# Patient Record
Sex: MALE | Race: ASIAN | NOT HISPANIC OR LATINO
[De-identification: names, ages, dates, MRNs, and addresses within clinical notes are randomized per-mention and may not be internally consistent; named-entity substitution may affect disease eponyms.]

---

## 2019-05-14 PROBLEM — Z00.00 ENCOUNTER FOR PREVENTIVE HEALTH EXAMINATION: Status: ACTIVE | Noted: 2019-05-14

## 2019-05-17 ENCOUNTER — APPOINTMENT (OUTPATIENT)
Dept: CARDIOLOGY | Facility: CLINIC | Age: 74
End: 2019-05-17
Payer: COMMERCIAL

## 2019-05-17 ENCOUNTER — NON-APPOINTMENT (OUTPATIENT)
Age: 74
End: 2019-05-17

## 2019-05-17 VITALS
DIASTOLIC BLOOD PRESSURE: 73 MMHG | HEIGHT: 63 IN | BODY MASS INDEX: 23.04 KG/M2 | HEART RATE: 65 BPM | WEIGHT: 130 LBS | OXYGEN SATURATION: 95 % | SYSTOLIC BLOOD PRESSURE: 152 MMHG

## 2019-05-17 DIAGNOSIS — H40.9 UNSPECIFIED GLAUCOMA: ICD-10-CM

## 2019-05-17 PROCEDURE — 99205 OFFICE O/P NEW HI 60 MIN: CPT

## 2019-05-17 PROCEDURE — 93000 ELECTROCARDIOGRAM COMPLETE: CPT

## 2019-05-17 RX ORDER — MULTIVITAMIN
TABLET ORAL
Refills: 0 | Status: ACTIVE | COMMUNITY

## 2019-05-17 RX ORDER — CHROMIUM 200 MCG
TABLET ORAL
Refills: 0 | Status: ACTIVE | COMMUNITY

## 2019-05-17 RX ORDER — ASPIRIN 81 MG/1
81 TABLET ORAL
Refills: 0 | Status: ACTIVE | COMMUNITY

## 2019-06-06 NOTE — PHYSICAL EXAM
[General Appearance - Well Developed] : well developed [Normal Appearance] : normal appearance [No Deformities] : no deformities [General Appearance - Well Nourished] : well nourished [Well Groomed] : well groomed [Normal Conjunctiva] : the conjunctiva exhibited no abnormalities [General Appearance - In No Acute Distress] : no acute distress [Normal Oral Mucosa] : normal oral mucosa [Eyelids - No Xanthelasma] : the eyelids demonstrated no xanthelasmas [No Oral Pallor] : no oral pallor [Normal Jugular Venous A Waves Present] : normal jugular venous A waves present [No Oral Cyanosis] : no oral cyanosis [Normal Jugular Venous V Waves Present] : normal jugular venous V waves present [Heart Rate And Rhythm] : heart rate and rhythm were normal [No Jugular Venous Myers A Waves] : no jugular venous myers A waves [Heart Sounds] : normal S1 and S2 [Murmurs] : no murmurs present [Respiration, Rhythm And Depth] : normal respiratory rhythm and effort [Exaggerated Use Of Accessory Muscles For Inspiration] : no accessory muscle use [Auscultation Breath Sounds / Voice Sounds] : lungs were clear to auscultation bilaterally [Abdomen Tenderness] : non-tender [Abdomen Soft] : soft [Gait - Sufficient For Exercise Testing] : the gait was sufficient for exercise testing [Abdomen Mass (___ Cm)] : no abdominal mass palpated [Abnormal Walk] : normal gait [Nail Clubbing] : no clubbing of the fingernails [Cyanosis, Localized] : no localized cyanosis [Petechial Hemorrhages (___cm)] : no petechial hemorrhages [Skin Color & Pigmentation] : normal skin color and pigmentation [Skin Lesions] : no skin lesions [] : no rash [No Venous Stasis] : no venous stasis [No Xanthoma] : no  xanthoma was observed [No Skin Ulcers] : no skin ulcer [Oriented To Time, Place, And Person] : oriented to person, place, and time [Affect] : the affect was normal [Mood] : the mood was normal [No Anxiety] : not feeling anxious

## 2019-06-06 NOTE — HISTORY OF PRESENT ILLNESS
[FreeTextEntry1] : 73 year old male with h/o CAD, HTN, HLD who presents withseveral weeks of chest pressure with exertion .  Denies SOB or H/a, rest sx or radiation of pain

## 2019-06-06 NOTE — DISCUSSION/SUMMARY
[FreeTextEntry1] : CAD/CP- plan nuclear stress test for new onset anginal sx\par \par HTN- stable\par \par HLD- controlled\par \par Followup in 2 weeks

## 2019-06-11 ENCOUNTER — OUTPATIENT (OUTPATIENT)
Dept: OUTPATIENT SERVICES | Facility: HOSPITAL | Age: 74
LOS: 1 days | End: 2019-06-11
Payer: MEDICARE

## 2019-06-11 ENCOUNTER — APPOINTMENT (OUTPATIENT)
Dept: CV DIAGNOSTICS | Facility: HOSPITAL | Age: 74
End: 2019-06-11

## 2019-06-11 DIAGNOSIS — I25.10 ATHEROSCLEROTIC HEART DISEASE OF NATIVE CORONARY ARTERY WITHOUT ANGINA PECTORIS: ICD-10-CM

## 2019-06-11 PROCEDURE — 78452 HT MUSCLE IMAGE SPECT MULT: CPT | Mod: 26

## 2019-06-11 PROCEDURE — 93018 CV STRESS TEST I&R ONLY: CPT

## 2019-06-11 PROCEDURE — 93017 CV STRESS TEST TRACING ONLY: CPT

## 2019-06-11 PROCEDURE — 78452 HT MUSCLE IMAGE SPECT MULT: CPT

## 2019-06-11 PROCEDURE — A9500: CPT

## 2019-06-11 PROCEDURE — 93016 CV STRESS TEST SUPVJ ONLY: CPT

## 2019-07-16 ENCOUNTER — OUTPATIENT (OUTPATIENT)
Dept: OUTPATIENT SERVICES | Facility: HOSPITAL | Age: 74
LOS: 1 days | End: 2019-07-16
Payer: MEDICARE

## 2019-07-16 VITALS
RESPIRATION RATE: 16 BRPM | SYSTOLIC BLOOD PRESSURE: 148 MMHG | HEIGHT: 63 IN | DIASTOLIC BLOOD PRESSURE: 77 MMHG | OXYGEN SATURATION: 98 % | WEIGHT: 130.07 LBS | TEMPERATURE: 98 F | HEART RATE: 56 BPM

## 2019-07-16 DIAGNOSIS — R07.89 OTHER CHEST PAIN: ICD-10-CM

## 2019-07-16 LAB
ALBUMIN SERPL ELPH-MCNC: 4.4 G/DL — SIGNIFICANT CHANGE UP (ref 3.3–5)
ALP SERPL-CCNC: 70 U/L — SIGNIFICANT CHANGE UP (ref 40–120)
ALT FLD-CCNC: 15 U/L — SIGNIFICANT CHANGE UP (ref 10–45)
ANION GAP SERPL CALC-SCNC: 9 MMOL/L — SIGNIFICANT CHANGE UP (ref 5–17)
AST SERPL-CCNC: 24 U/L — SIGNIFICANT CHANGE UP (ref 10–40)
BILIRUB SERPL-MCNC: 0.8 MG/DL — SIGNIFICANT CHANGE UP (ref 0.2–1.2)
BUN SERPL-MCNC: 17 MG/DL — SIGNIFICANT CHANGE UP (ref 7–23)
CALCIUM SERPL-MCNC: 9.8 MG/DL — SIGNIFICANT CHANGE UP (ref 8.4–10.5)
CHLORIDE SERPL-SCNC: 101 MMOL/L — SIGNIFICANT CHANGE UP (ref 96–108)
CO2 SERPL-SCNC: 27 MMOL/L — SIGNIFICANT CHANGE UP (ref 22–31)
CREAT SERPL-MCNC: 1.41 MG/DL — HIGH (ref 0.5–1.3)
GLUCOSE SERPL-MCNC: 105 MG/DL — HIGH (ref 70–99)
HCT VFR BLD CALC: 44.4 % — SIGNIFICANT CHANGE UP (ref 39–50)
HGB BLD-MCNC: 15 G/DL — SIGNIFICANT CHANGE UP (ref 13–17)
MCHC RBC-ENTMCNC: 32.4 PG — SIGNIFICANT CHANGE UP (ref 27–34)
MCHC RBC-ENTMCNC: 33.9 GM/DL — SIGNIFICANT CHANGE UP (ref 32–36)
MCV RBC AUTO: 95.5 FL — SIGNIFICANT CHANGE UP (ref 80–100)
PLATELET # BLD AUTO: 217 K/UL — SIGNIFICANT CHANGE UP (ref 150–400)
POTASSIUM SERPL-MCNC: 4.2 MMOL/L — SIGNIFICANT CHANGE UP (ref 3.5–5.3)
POTASSIUM SERPL-SCNC: 4.2 MMOL/L — SIGNIFICANT CHANGE UP (ref 3.5–5.3)
PROT SERPL-MCNC: 7.8 G/DL — SIGNIFICANT CHANGE UP (ref 6–8.3)
RBC # BLD: 4.65 M/UL — SIGNIFICANT CHANGE UP (ref 4.2–5.8)
RBC # FLD: 10.7 % — SIGNIFICANT CHANGE UP (ref 10.3–14.5)
SODIUM SERPL-SCNC: 137 MMOL/L — SIGNIFICANT CHANGE UP (ref 135–145)
WBC # BLD: 5.8 K/UL — SIGNIFICANT CHANGE UP (ref 3.8–10.5)
WBC # FLD AUTO: 5.8 K/UL — SIGNIFICANT CHANGE UP (ref 3.8–10.5)

## 2019-07-16 PROCEDURE — 93455 CORONARY ART/GRFT ANGIO S&I: CPT

## 2019-07-16 PROCEDURE — 93005 ELECTROCARDIOGRAM TRACING: CPT

## 2019-07-16 PROCEDURE — 99152 MOD SED SAME PHYS/QHP 5/>YRS: CPT | Mod: GC

## 2019-07-16 PROCEDURE — C1894: CPT

## 2019-07-16 PROCEDURE — C1769: CPT

## 2019-07-16 PROCEDURE — 99152 MOD SED SAME PHYS/QHP 5/>YRS: CPT

## 2019-07-16 PROCEDURE — C1887: CPT

## 2019-07-16 PROCEDURE — 93567 NJX CAR CTH SPRVLV AORTGRPHY: CPT | Mod: GC

## 2019-07-16 PROCEDURE — 80053 COMPREHEN METABOLIC PANEL: CPT

## 2019-07-16 PROCEDURE — 93455 CORONARY ART/GRFT ANGIO S&I: CPT | Mod: 26,GC

## 2019-07-16 PROCEDURE — 93567 NJX CAR CTH SPRVLV AORTGRPHY: CPT

## 2019-07-16 PROCEDURE — 85027 COMPLETE CBC AUTOMATED: CPT

## 2019-07-16 PROCEDURE — 93010 ELECTROCARDIOGRAM REPORT: CPT | Mod: 59

## 2019-07-16 NOTE — H&P CARDIOLOGY - HISTORY OF PRESENT ILLNESS
73 years old  ([place]) male with PMHx CDA s/p CABG 2009, HTN, HLD who was evaluated by Dr Coffey for several weeks of chest pressure and dyspnea and underwent nuclear stress test that was abnormal (result below) and was referred for [facility] for further ischemic evaluation. Denied dizziness, diaphoresis, palpitations, nausea, vomiting, peripheral edema, recent weight gain, or syncope.     < from: Nuclear Stress Test-Exercise (06.11.19 @ 10:06) >  IMPRESSIONS:Abnormal Study  * Exercise capacity: 10 METS, Excellent for age and  gender.  * Chest Pain: No chest pain with exercise.  * Symptom: Fatigue.  * HR Response: Appropriate.  * BP Response: Appropriate.  * Heart Rhythm: Sinus Bradycardia - 58 BPM.  * Conduction defects: incomplete right bundle brach block.  * Baseline ECG: Nonspecific ST-T wave abnormality.  * ECG Changes: ST Depression: 0.5 mm upsloping in leads  V4-V6 started at 08:31 min of exercise at HR of 142 and  persisted 03:00 min into recovery.  * Arrhythmia: None.  * The left ventricle was small in size. There is a small,  mild to moderate defect in the mid to distal anterolateral  wall that is mostly reversible suggestive of ischemia with  partial scarring.  * There are medium-sized, mild to moderate defects in the  basal to mid inferior and basal inferoseptal walls that  are mostly fixed suggestive of infarct with mild  ernesto-infarct ischemia.  * Post-stress gated wall motion analysis was performed  (LVEF > 70%;LVEDV = 43 ml.) revealing mild hypokinesis of  the basal to mid inferior wall and reduced systolic  thickening of the mid to distal anterolateral wall.  There  is paradoxical septal motion consistent with a  post-operative state.  *** No previous Nuclear/Stress exam.  ------------------------------------------------------------------------  Confirmed on  6/11/2019 - 12:08:29 by Malcolm Alston M.D.  ------------------------------------------------------------------------    < end of copied text > 73 years old  ([place]) male with PMHx CDA s/p CABG 2009, HTN, HLD who was evaluated by Dr Coffey for several weeks of chest pressure and dyspnea and underwent nuclear stress test that was abnormal (result below) and was referred for [facility] for further ischemic evaluation today. Pt denies dizziness, diaphoresis, palpitations, nausea, vomiting, peripheral edema, recent weight gain, or syncope.     < from: Nuclear Stress Test-Exercise (06.11.19 @ 10:06) >  IMPRESSIONS:Abnormal Study  * Exercise capacity: 10 METS, Excellent for age and  gender.  * Chest Pain: No chest pain with exercise.  * Symptom: Fatigue.  * HR Response: Appropriate.  * BP Response: Appropriate.  * Heart Rhythm: Sinus Bradycardia - 58 BPM.  * Conduction defects: incomplete right bundle brach block.  * Baseline ECG: Nonspecific ST-T wave abnormality.  * ECG Changes: ST Depression: 0.5 mm upsloping in leads  V4-V6 started at 08:31 min of exercise at HR of 142 and  persisted 03:00 min into recovery.  * Arrhythmia: None.  * The left ventricle was small in size. There is a small,  mild to moderate defect in the mid to distal anterolateral  wall that is mostly reversible suggestive of ischemia with  partial scarring.  * There are medium-sized, mild to moderate defects in the  basal to mid inferior and basal inferoseptal walls that  are mostly fixed suggestive of infarct with mild  ernesto-infarct ischemia.  * Post-stress gated wall motion analysis was performed  (LVEF > 70%;LVEDV = 43 ml.) revealing mild hypokinesis of  the basal to mid inferior wall and reduced systolic  thickening of the mid to distal anterolateral wall.  There  is paradoxical septal motion consistent with a  post-operative state.  *** No previous Nuclear/Stress exam.  ------------------------------------------------------------------------  Confirmed on  6/11/2019 - 12:08:29 by Malcolm Alston M.D.  ------------------------------------------------------------------------    < end of copied text >

## 2019-07-16 NOTE — H&P CARDIOLOGY - PSH
Bilateral Cataracts  Patient had macular repair 2000.  S/P CABG X 4  6/2009  S/P Coronary Angiogram  with stent - 2001

## 2019-07-18 ENCOUNTER — TRANSCRIPTION ENCOUNTER (OUTPATIENT)
Age: 74
End: 2019-07-18

## 2019-07-22 ENCOUNTER — APPOINTMENT (OUTPATIENT)
Dept: CARDIOLOGY | Facility: CLINIC | Age: 74
End: 2019-07-22
Payer: MEDICARE

## 2019-07-22 ENCOUNTER — NON-APPOINTMENT (OUTPATIENT)
Age: 74
End: 2019-07-22

## 2019-07-22 VITALS
WEIGHT: 130 LBS | HEIGHT: 63 IN | SYSTOLIC BLOOD PRESSURE: 155 MMHG | OXYGEN SATURATION: 96 % | DIASTOLIC BLOOD PRESSURE: 74 MMHG | HEART RATE: 57 BPM | BODY MASS INDEX: 23.04 KG/M2

## 2019-07-22 PROBLEM — I25.10 ATHEROSCLEROTIC HEART DISEASE OF NATIVE CORONARY ARTERY WITHOUT ANGINA PECTORIS: Chronic | Status: ACTIVE | Noted: 2019-07-16

## 2019-07-22 PROBLEM — I10 ESSENTIAL (PRIMARY) HYPERTENSION: Chronic | Status: ACTIVE | Noted: 2019-07-16

## 2019-07-22 PROCEDURE — 93000 ELECTROCARDIOGRAM COMPLETE: CPT

## 2019-07-22 PROCEDURE — 99215 OFFICE O/P EST HI 40 MIN: CPT

## 2019-08-04 NOTE — DISCUSSION/SUMMARY
[FreeTextEntry1] : CAD/CP-stress positive.  Plan cath\par \par HTN- stable\par \par HLD- controlled\par \par Followup in 3 weeks

## 2019-08-04 NOTE — HISTORY OF PRESENT ILLNESS
[FreeTextEntry1] : 73 year old male with h/o CAD, HTN, HLD who presents withseveral weeks of chest pressure with exertion .  Denies SOB or H/a, rest sx or radiation of pain.  Stress test with ischemia

## 2019-08-04 NOTE — PHYSICAL EXAM
[General Appearance - Well Developed] : well developed [Normal Appearance] : normal appearance [General Appearance - Well Nourished] : well nourished [Well Groomed] : well groomed [General Appearance - In No Acute Distress] : no acute distress [No Deformities] : no deformities [Eyelids - No Xanthelasma] : the eyelids demonstrated no xanthelasmas [Normal Conjunctiva] : the conjunctiva exhibited no abnormalities [No Oral Pallor] : no oral pallor [Normal Oral Mucosa] : normal oral mucosa [Normal Jugular Venous A Waves Present] : normal jugular venous A waves present [No Oral Cyanosis] : no oral cyanosis [Normal Jugular Venous V Waves Present] : normal jugular venous V waves present [No Jugular Venous Myers A Waves] : no jugular venous myers A waves [Respiration, Rhythm And Depth] : normal respiratory rhythm and effort [Auscultation Breath Sounds / Voice Sounds] : lungs were clear to auscultation bilaterally [Heart Rate And Rhythm] : heart rate and rhythm were normal [Exaggerated Use Of Accessory Muscles For Inspiration] : no accessory muscle use [Heart Sounds] : normal S1 and S2 [Murmurs] : no murmurs present [Abdomen Soft] : soft [Abdomen Tenderness] : non-tender [Abnormal Walk] : normal gait [Abdomen Mass (___ Cm)] : no abdominal mass palpated [Gait - Sufficient For Exercise Testing] : the gait was sufficient for exercise testing [Nail Clubbing] : no clubbing of the fingernails [Petechial Hemorrhages (___cm)] : no petechial hemorrhages [Cyanosis, Localized] : no localized cyanosis [] : no rash [Skin Color & Pigmentation] : normal skin color and pigmentation [Skin Lesions] : no skin lesions [No Venous Stasis] : no venous stasis [No Skin Ulcers] : no skin ulcer [No Xanthoma] : no  xanthoma was observed [Oriented To Time, Place, And Person] : oriented to person, place, and time [Affect] : the affect was normal [Mood] : the mood was normal [No Anxiety] : not feeling anxious

## 2020-02-14 ENCOUNTER — NON-APPOINTMENT (OUTPATIENT)
Age: 75
End: 2020-02-14

## 2020-02-14 ENCOUNTER — APPOINTMENT (OUTPATIENT)
Dept: CARDIOLOGY | Facility: CLINIC | Age: 75
End: 2020-02-14
Payer: MEDICARE

## 2020-02-14 VITALS
BODY MASS INDEX: 22.15 KG/M2 | HEIGHT: 63 IN | WEIGHT: 125 LBS | HEART RATE: 58 BPM | SYSTOLIC BLOOD PRESSURE: 144 MMHG | DIASTOLIC BLOOD PRESSURE: 77 MMHG | OXYGEN SATURATION: 98 %

## 2020-02-14 PROCEDURE — 99214 OFFICE O/P EST MOD 30 MIN: CPT

## 2020-02-14 PROCEDURE — 93000 ELECTROCARDIOGRAM COMPLETE: CPT

## 2020-03-15 NOTE — PHYSICAL EXAM
[General Appearance - Well Developed] : well developed [Normal Appearance] : normal appearance [Well Groomed] : well groomed [General Appearance - Well Nourished] : well nourished [No Deformities] : no deformities [General Appearance - In No Acute Distress] : no acute distress [Normal Conjunctiva] : the conjunctiva exhibited no abnormalities [Eyelids - No Xanthelasma] : the eyelids demonstrated no xanthelasmas [Normal Oral Mucosa] : normal oral mucosa [No Oral Pallor] : no oral pallor [No Oral Cyanosis] : no oral cyanosis [Normal Jugular Venous A Waves Present] : normal jugular venous A waves present [Normal Jugular Venous V Waves Present] : normal jugular venous V waves present [No Jugular Venous Myers A Waves] : no jugular venous myers A waves [Respiration, Rhythm And Depth] : normal respiratory rhythm and effort [Exaggerated Use Of Accessory Muscles For Inspiration] : no accessory muscle use [Auscultation Breath Sounds / Voice Sounds] : lungs were clear to auscultation bilaterally [Heart Rate And Rhythm] : heart rate and rhythm were normal [Heart Sounds] : normal S1 and S2 [Murmurs] : no murmurs present [Abdomen Soft] : soft [Abdomen Tenderness] : non-tender [Abdomen Mass (___ Cm)] : no abdominal mass palpated [Abnormal Walk] : normal gait [Gait - Sufficient For Exercise Testing] : the gait was sufficient for exercise testing [Nail Clubbing] : no clubbing of the fingernails [Cyanosis, Localized] : no localized cyanosis [Petechial Hemorrhages (___cm)] : no petechial hemorrhages [Skin Color & Pigmentation] : normal skin color and pigmentation [] : no rash [No Venous Stasis] : no venous stasis [Skin Lesions] : no skin lesions [No Skin Ulcers] : no skin ulcer [No Xanthoma] : no  xanthoma was observed [Oriented To Time, Place, And Person] : oriented to person, place, and time [Affect] : the affect was normal [Mood] : the mood was normal [No Anxiety] : not feeling anxious

## 2020-03-15 NOTE — HISTORY OF PRESENT ILLNESS
[FreeTextEntry1] : 74 year old male with h/o CAD, HTN, HLD who feels well without any c/o CP, SOB or H/A

## 2020-03-15 NOTE — DISCUSSION/SUMMARY
[FreeTextEntry1] : CAD/CP-no sx\par \par HTN- stable\par \par HLD- controlled\par \par Followup in 3 mths

## 2020-08-14 ENCOUNTER — APPOINTMENT (OUTPATIENT)
Dept: CARDIOLOGY | Facility: CLINIC | Age: 75
End: 2020-08-14

## 2020-08-21 ENCOUNTER — APPOINTMENT (OUTPATIENT)
Dept: CARDIOLOGY | Facility: CLINIC | Age: 75
End: 2020-08-21
Payer: MEDICARE

## 2020-08-21 ENCOUNTER — NON-APPOINTMENT (OUTPATIENT)
Age: 75
End: 2020-08-21

## 2020-08-21 VITALS
OXYGEN SATURATION: 100 % | BODY MASS INDEX: 22.14 KG/M2 | HEART RATE: 50 BPM | WEIGHT: 125 LBS | DIASTOLIC BLOOD PRESSURE: 76 MMHG | SYSTOLIC BLOOD PRESSURE: 145 MMHG

## 2020-08-21 PROCEDURE — 99214 OFFICE O/P EST MOD 30 MIN: CPT

## 2020-08-21 PROCEDURE — 93000 ELECTROCARDIOGRAM COMPLETE: CPT

## 2020-09-10 NOTE — DISCUSSION/SUMMARY
[FreeTextEntry1] : CAD/CP-no sx\par \par HTN- stable\par \par HLD- controlled\par \par Followup in 6 mths

## 2020-09-10 NOTE — HISTORY OF PRESENT ILLNESS
[FreeTextEntry1] : 75 year old male with h/o CAD, HTN, HLD who feels well without any c/o CP, SOB or H/A

## 2021-03-05 ENCOUNTER — APPOINTMENT (OUTPATIENT)
Dept: CARDIOLOGY | Facility: CLINIC | Age: 76
End: 2021-03-05
Payer: MEDICARE

## 2021-03-05 ENCOUNTER — NON-APPOINTMENT (OUTPATIENT)
Age: 76
End: 2021-03-05

## 2021-03-05 VITALS
HEART RATE: 60 BPM | DIASTOLIC BLOOD PRESSURE: 69 MMHG | SYSTOLIC BLOOD PRESSURE: 148 MMHG | BODY MASS INDEX: 22.15 KG/M2 | OXYGEN SATURATION: 99 % | WEIGHT: 125 LBS | HEIGHT: 63 IN

## 2021-03-05 PROCEDURE — 99072 ADDL SUPL MATRL&STAF TM PHE: CPT

## 2021-03-05 PROCEDURE — 93000 ELECTROCARDIOGRAM COMPLETE: CPT

## 2021-03-05 PROCEDURE — 99214 OFFICE O/P EST MOD 30 MIN: CPT

## 2021-03-15 NOTE — HISTORY OF PRESENT ILLNESS
[FreeTextEntry1] : 75 year old male with h/o CAD s/p CABG, HTN, HLD who feels well without any c/o CP, SOB or H/A

## 2021-06-20 ENCOUNTER — INPATIENT (INPATIENT)
Facility: HOSPITAL | Age: 76
LOS: 3 days | Discharge: HOME | DRG: 867 | End: 2021-06-24
Attending: INTERNAL MEDICINE | Admitting: INTERNAL MEDICINE
Payer: COMMERCIAL

## 2021-06-20 VITALS
RESPIRATION RATE: 19 BRPM | SYSTOLIC BLOOD PRESSURE: 122 MMHG | TEMPERATURE: 100 F | OXYGEN SATURATION: 98 % | HEIGHT: 63 IN | WEIGHT: 130.07 LBS | HEART RATE: 69 BPM | DIASTOLIC BLOOD PRESSURE: 57 MMHG

## 2021-06-20 DIAGNOSIS — R50.9 FEVER, UNSPECIFIED: ICD-10-CM

## 2021-06-20 LAB
ALBUMIN SERPL ELPH-MCNC: 3.4 G/DL — SIGNIFICANT CHANGE UP (ref 3.3–5)
ALP SERPL-CCNC: 68 U/L — SIGNIFICANT CHANGE UP (ref 40–120)
ALT FLD-CCNC: 20 U/L — SIGNIFICANT CHANGE UP (ref 10–45)
ANION GAP SERPL CALC-SCNC: 12 MMOL/L — SIGNIFICANT CHANGE UP (ref 5–17)
APPEARANCE UR: CLEAR — SIGNIFICANT CHANGE UP
AST SERPL-CCNC: 47 U/L — HIGH (ref 10–40)
BACTERIA # UR AUTO: NEGATIVE — SIGNIFICANT CHANGE UP
BASOPHILS # BLD AUTO: 0 K/UL — SIGNIFICANT CHANGE UP (ref 0–0.2)
BASOPHILS NFR BLD AUTO: 0 % — SIGNIFICANT CHANGE UP (ref 0–2)
BILIRUB SERPL-MCNC: 0.9 MG/DL — SIGNIFICANT CHANGE UP (ref 0.2–1.2)
BILIRUB UR-MCNC: NEGATIVE — SIGNIFICANT CHANGE UP
BUN SERPL-MCNC: 24 MG/DL — HIGH (ref 7–23)
CALCIUM SERPL-MCNC: 9 MG/DL — SIGNIFICANT CHANGE UP (ref 8.4–10.5)
CHLORIDE SERPL-SCNC: 101 MMOL/L — SIGNIFICANT CHANGE UP (ref 96–108)
CO2 SERPL-SCNC: 22 MMOL/L — SIGNIFICANT CHANGE UP (ref 22–31)
COLOR SPEC: YELLOW — SIGNIFICANT CHANGE UP
CREAT SERPL-MCNC: 1.59 MG/DL — HIGH (ref 0.5–1.3)
DIFF PNL FLD: NEGATIVE — SIGNIFICANT CHANGE UP
EOSINOPHIL # BLD AUTO: 0 K/UL — SIGNIFICANT CHANGE UP (ref 0–0.5)
EOSINOPHIL NFR BLD AUTO: 0 % — SIGNIFICANT CHANGE UP (ref 0–6)
EPI CELLS # UR: 0 /HPF — SIGNIFICANT CHANGE UP
GAS PNL BLDV: SIGNIFICANT CHANGE UP
GLUCOSE SERPL-MCNC: 113 MG/DL — HIGH (ref 70–99)
GLUCOSE UR QL: NEGATIVE — SIGNIFICANT CHANGE UP
HCT VFR BLD CALC: 35 % — LOW (ref 39–50)
HGB BLD-MCNC: 11.6 G/DL — LOW (ref 13–17)
KETONES UR-MCNC: NEGATIVE — SIGNIFICANT CHANGE UP
LEUKOCYTE ESTERASE UR-ACNC: NEGATIVE — SIGNIFICANT CHANGE UP
LYMPHOCYTES # BLD AUTO: 1.19 K/UL — SIGNIFICANT CHANGE UP (ref 1–3.3)
LYMPHOCYTES # BLD AUTO: 19.1 % — SIGNIFICANT CHANGE UP (ref 13–44)
MAGNESIUM SERPL-MCNC: 2.4 MG/DL — SIGNIFICANT CHANGE UP (ref 1.6–2.6)
MCHC RBC-ENTMCNC: 31.3 PG — SIGNIFICANT CHANGE UP (ref 27–34)
MCHC RBC-ENTMCNC: 33.1 GM/DL — SIGNIFICANT CHANGE UP (ref 32–36)
MCV RBC AUTO: 94.3 FL — SIGNIFICANT CHANGE UP (ref 80–100)
MONOCYTES # BLD AUTO: 0.92 K/UL — HIGH (ref 0–0.9)
MONOCYTES NFR BLD AUTO: 14.8 % — HIGH (ref 2–14)
NEUTROPHILS # BLD AUTO: 3.68 K/UL — SIGNIFICANT CHANGE UP (ref 1.8–7.4)
NEUTROPHILS NFR BLD AUTO: 55.6 % — SIGNIFICANT CHANGE UP (ref 43–77)
NITRITE UR-MCNC: NEGATIVE — SIGNIFICANT CHANGE UP
PH UR: 6 — SIGNIFICANT CHANGE UP (ref 5–8)
PHOSPHATE SERPL-MCNC: 3.7 MG/DL — SIGNIFICANT CHANGE UP (ref 2.5–4.5)
PLATELET # BLD AUTO: SIGNIFICANT CHANGE UP K/UL (ref 150–400)
POTASSIUM SERPL-MCNC: 4.6 MMOL/L — SIGNIFICANT CHANGE UP (ref 3.5–5.3)
POTASSIUM SERPL-SCNC: 4.6 MMOL/L — SIGNIFICANT CHANGE UP (ref 3.5–5.3)
PROT SERPL-MCNC: 7.6 G/DL — SIGNIFICANT CHANGE UP (ref 6–8.3)
PROT UR-MCNC: ABNORMAL
RAPID RVP RESULT: SIGNIFICANT CHANGE UP
RBC # BLD: 3.71 M/UL — LOW (ref 4.2–5.8)
RBC # FLD: 11.8 % — SIGNIFICANT CHANGE UP (ref 10.3–14.5)
RBC CASTS # UR COMP ASSIST: 2 /HPF — SIGNIFICANT CHANGE UP (ref 0–4)
SARS-COV-2 RNA SPEC QL NAA+PROBE: SIGNIFICANT CHANGE UP
SODIUM SERPL-SCNC: 135 MMOL/L — SIGNIFICANT CHANGE UP (ref 135–145)
SP GR SPEC: 1.01 — SIGNIFICANT CHANGE UP (ref 1.01–1.02)
UROBILINOGEN FLD QL: NEGATIVE — SIGNIFICANT CHANGE UP
WBC # BLD: 6.22 K/UL — SIGNIFICANT CHANGE UP (ref 3.8–10.5)
WBC # FLD AUTO: 6.22 K/UL — SIGNIFICANT CHANGE UP (ref 3.8–10.5)
WBC UR QL: 0 /HPF — SIGNIFICANT CHANGE UP (ref 0–5)

## 2021-06-20 PROCEDURE — 93010 ELECTROCARDIOGRAM REPORT: CPT

## 2021-06-20 PROCEDURE — 99223 1ST HOSP IP/OBS HIGH 75: CPT

## 2021-06-20 PROCEDURE — 99285 EMERGENCY DEPT VISIT HI MDM: CPT

## 2021-06-20 PROCEDURE — 71045 X-RAY EXAM CHEST 1 VIEW: CPT | Mod: 26

## 2021-06-20 RX ORDER — SODIUM CHLORIDE 9 MG/ML
500 INJECTION INTRAMUSCULAR; INTRAVENOUS; SUBCUTANEOUS ONCE
Refills: 0 | Status: COMPLETED | OUTPATIENT
Start: 2021-06-20 | End: 2021-06-20

## 2021-06-20 RX ORDER — OMEGA-3 ACID ETHYL ESTERS 1 G
5 CAPSULE ORAL
Qty: 0 | Refills: 0 | DISCHARGE

## 2021-06-20 RX ORDER — SODIUM CHLORIDE 9 MG/ML
1000 INJECTION INTRAMUSCULAR; INTRAVENOUS; SUBCUTANEOUS ONCE
Refills: 0 | Status: COMPLETED | OUTPATIENT
Start: 2021-06-20 | End: 2021-06-20

## 2021-06-20 RX ORDER — ACETAMINOPHEN 500 MG
975 TABLET ORAL ONCE
Refills: 0 | Status: COMPLETED | OUTPATIENT
Start: 2021-06-20 | End: 2021-06-20

## 2021-06-20 RX ORDER — MULTIVIT-MIN/FERROUS GLUCONATE 9 MG/15 ML
1 LIQUID (ML) ORAL
Qty: 0 | Refills: 0 | DISCHARGE

## 2021-06-20 RX ORDER — PIPERACILLIN AND TAZOBACTAM 4; .5 G/20ML; G/20ML
3.38 INJECTION, POWDER, LYOPHILIZED, FOR SOLUTION INTRAVENOUS ONCE
Refills: 0 | Status: COMPLETED | OUTPATIENT
Start: 2021-06-20 | End: 2021-06-20

## 2021-06-20 RX ORDER — ATENOLOL 25 MG/1
1 TABLET ORAL
Qty: 0 | Refills: 0 | DISCHARGE

## 2021-06-20 RX ADMIN — SODIUM CHLORIDE 500 MILLILITER(S): 9 INJECTION INTRAMUSCULAR; INTRAVENOUS; SUBCUTANEOUS at 20:48

## 2021-06-20 RX ADMIN — SODIUM CHLORIDE 1000 MILLILITER(S): 9 INJECTION INTRAMUSCULAR; INTRAVENOUS; SUBCUTANEOUS at 19:25

## 2021-06-20 RX ADMIN — PIPERACILLIN AND TAZOBACTAM 200 GRAM(S): 4; .5 INJECTION, POWDER, LYOPHILIZED, FOR SOLUTION INTRAVENOUS at 21:45

## 2021-06-20 RX ADMIN — Medication 975 MILLIGRAM(S): at 18:54

## 2021-06-20 NOTE — ED ADULT NURSE REASSESSMENT NOTE - NS ED NURSE REASSESS COMMENT FT1
Received report @ 1900, pt lying in bed comfortably & denies any pain/discomfort. Pt given 1L NS for BP as per MD order. Pt given call bell and educated on how to use.

## 2021-06-20 NOTE — ED PROVIDER NOTE - PHYSICAL EXAMINATION
GENERAL: NAD, well-developed  HEAD:  Atraumatic, Normocephalic  EYES: EOMI, PERRLA, conjunctiva and sclera clear  NECK: Supple, No JVD  CHEST/LUNG: Clear to auscultation bilaterally; wheeze RUL  HEART: Regular rate and rhythm; No murmurs, rubs, or gallops  ABDOMEN: Soft, Nontender, Nondistended; Bowel sounds present  EXTREMITIES:  2+ Peripheral Pulses, No clubbing, cyanosis, or edema  PSYCH: AAOx3  NEUROLOGY: non-focal  SKIN: No rashes or lesions

## 2021-06-20 NOTE — ED ADULT NURSE NOTE - CHIEF COMPLAINT QUOTE
fever and fatigue x 4 days. Pt denies cough, abd pain, chest pain, burning upon urination. Pt fully vaccinated for COVID.

## 2021-06-20 NOTE — H&P ADULT - NSHPLABSRESULTS_GEN_ALL_CORE
Labs personally reviewed:                        11.6   6.22  )-----------( Clumped    ( 2021 16:50 )             35.0     06-20    135  |  101  |  24<H>  ----------------------------<  113<H>  4.6   |  22  |  1.59<H>    Ca    9.0      2021 16:50  Phos  3.7     06-20  Mg     2.4     06-20    TPro  7.6  /  Alb  3.4  /  TBili  0.9  /  DBili  x   /  AST  47<H>  /  ALT  20  /  AlkPhos  68  06-20        LIVER FUNCTIONS - ( 2021 16:50 )  Alb: 3.4 g/dL / Pro: 7.6 g/dL / ALK PHOS: 68 U/L / ALT: 20 U/L / AST: 47 U/L / GGT: x             Urinalysis Basic - ( 2021 18:31 )    Color: Yellow / Appearance: Clear / S.011 / pH: x  Gluc: x / Ketone: Negative  / Bili: Negative / Urobili: Negative   Blood: x / Protein: Trace / Nitrite: Negative   Leuk Esterase: Negative / RBC: 2 /hpf / WBC 0 /HPF   Sq Epi: x / Non Sq Epi: 0 /hpf / Bacteria: Negative      Imaging personally reviewed-no clear source of fever    EKG ordered    CT ordered

## 2021-06-20 NOTE — H&P ADULT - NSICDXPASTSURGICALHX_GEN_ALL_CORE_FT
PAST SURGICAL HISTORY:  Bilateral Cataracts Patient had macular repair 2000.    S/P CABG X 4 6/2009    S/P Coronary Angiogram with stent - 2001

## 2021-06-20 NOTE — ED ADULT NURSE NOTE - OBJECTIVE STATEMENT
74 yo male from home A&OX4 ambulatory on arrival c/o fevers/fatigue over the last several days. Pt st has been experiencing low grade fevers responsive to Tylenol but recurrent with increasing levels of weakness/fatigue. Pt denies cough, SOB, chx pn, abd pn, n/v/d/dizziness. Pt st he was concerned also that his diastolic BP was in the 50's and has a hx of cardiac bypass.  Pt VS stable, labs drawn and sent, results pending.

## 2021-06-20 NOTE — H&P ADULT - NSHPPHYSICALEXAM_GEN_ALL_CORE
Vital Signs Last 24 Hrs  T(C): 36.8 (20 Jun 2021 22:09), Max: 38.5 (20 Jun 2021 18:45)  T(F): 98.3 (20 Jun 2021 22:09), Max: 101.3 (20 Jun 2021 18:45)  HR: 64 (20 Jun 2021 22:09) (64 - 76)  BP: 100/58 (20 Jun 2021 22:09) (97/52 - 122/57)  BP(mean): 68 (20 Jun 2021 21:53) (66 - 74)  RR: 18 (20 Jun 2021 22:09) (17 - 19)  SpO2: 97% (20 Jun 2021 22:09) (96% - 98%)    GENERAL: No acute distress, well-developed  HEAD:  Atraumatic, Normocephalic  EYES: EOMI, PERRLA, conjunctiva and sclera clear  ENT: Oral mucosa moist  NECK: Neck supple  CHEST/LUNG: Clear to auscultation bilaterally; No wheeze, no rales, no rhonchi.    HEART: Regular rate and rhythm; No murmurs, rubs, or gallops  ABDOMEN: Soft, Nontender, Nondistended; Bowel sounds present  EXTREMITIES:  No clubbing, cyanosis, or edema  VASCULAR: Posterior tibialis pulses intact bilaterally  PSYCH: Normal behavior, normal affect  NEUROLOGY: AAOx3  SKIN: grossly warm and dry

## 2021-06-20 NOTE — ED PROVIDER NOTE - ATTENDING CONTRIBUTION TO CARE
75yr M hx of CAD s/p stent on asa and CABG remotely w fever of uncertain origin. ROS neg. exam notable for well appearing, no distress, clear lungs, ear exam unremarkable, oropharynx clear, no cervical LAP, supple neck, S1-2, soft non distended abd, no peripheral edema and no skin lesions.  suspect viral infection, low concern for bacterial infection given immune competent and asx. will do labs, including trop, cxr, ua.   labs unremarkable, viral panel neg. pt had soft BPs, responded to bolus  repeat bps low. not safe for outpatient follow up, will do IV fluids, and empiric antibiotics and admit. pro estefany added.

## 2021-06-20 NOTE — H&P ADULT - ASSESSMENT
74 yo M w/ PMHx of CAD s/p stent (on ASA) and CABG, glaucoma, HTN, who is presenting for fever and weakness x 4 days associated with hypotension.

## 2021-06-20 NOTE — ED PROVIDER NOTE - CLINICAL SUMMARY MEDICAL DECISION MAKING FREE TEXT BOX
74 yo M w/ PMHx of CAD s/p stent (on ASA) and CABG who is presenting for fever x 4 days.      Sepsis w/u

## 2021-06-20 NOTE — ED PROVIDER NOTE - OBJECTIVE STATEMENT
74 yo M w/ PMHx of CAD s/p stent (on ASA) and CABG who is presenting for fever x 4 days.  Pt reports that he's been having fevers up to 101, 102 degrees w/ resolution w/ Tylenol.  He denies any symptoms: no SOB, no cough, no rhinorrhea, no abdominal pain, n/v/d/c, no dysuria or increased frequency.  Pt just reports feeling very weak and his blood pressure has 76 yo M w/ PMHx of CAD s/p stent (on ASA) and CABG who is presenting for fever x 4 days.  Pt reports that he's been having fevers up to 101, 102 degrees w/ resolution w/ Tylenol.  He denies any symptoms: no SOB, no cough, no rhinorrhea, no abdominal pain, n/v/d/c, no dysuria or increased frequency.  Pt just reports feeling very weak and his blood pressure has been lower than usual (diastolics in the 40s).  Pt has never had a history of sepsis or bacteremia.  No recent travel or sick contacts. Vaccination for COVID completed in Feb.

## 2021-06-20 NOTE — H&P ADULT - PROBLEM SELECTOR PLAN 1
Unclear cause  f/u cultures  given no clear source so far will obtain CT Chest/Abd/Pelvis to look for possible sources

## 2021-06-20 NOTE — ED PROVIDER NOTE - NS ED ROS FT
Spoke to pt and relayed provider's msg. Pt is now scheduled to see Eileen on 10/18/17 @ 10:45 am for f/u OV with labs (recheck CMP).    Sent adjusted dosage of rx for calcium over to [facility] pharmacy.  Pt is going to  calcium tablets at her local pharmacy to hold her until she get her rx in the mail.   CONSTITUTIONAL: + weakness, fevers or chills  EYES/ENT: No visual changes;  No vertigo or throat pain   NECK: No pain or stiffness  RESPIRATORY: No cough, wheezing, hemoptysis; No shortness of breath  CARDIOVASCULAR: No chest pain or palpitations  GASTROINTESTINAL: No abdominal or epigastric pain. No nausea, vomiting, or hematemesis; No diarrhea or constipation. No melena or hematochezia.  GENITOURINARY: No dysuria, frequency or hematuria  NEUROLOGICAL: No numbness + generalized weakness  SKIN: No itching, rashes

## 2021-06-20 NOTE — H&P ADULT - NSICDXPASTMEDICALHX_GEN_ALL_CORE_FT
PAST MEDICAL HISTORY:  CAD (coronary artery disease)     Colon Polyps     H/O: glaucoma     Hypercholesteremia     Hypertension

## 2021-06-20 NOTE — ED PROVIDER NOTE - DISPOSITION TYPE
Dermatology Rooming Note    Luan Mitchell's goals for this visit include:   Chief Complaint   Patient presents with     Derm Problem     Bereket is here today for a concerning area on his right hand.  States he noticed it in March.          aJylyn Houser LPN  
ADMIT

## 2021-06-20 NOTE — ED ADULT TRIAGE NOTE - CHIEF COMPLAINT QUOTE
fever and fatigue x 4 days. Pt denies cough, abd pain, burning upon urination. fever and fatigue x 4 days. Pt denies cough, abd pain, burning upon urination. Pt fully vaccinated for COVID. fever and fatigue x 4 days. Pt denies cough, abd pain, chest pain, burning upon urination. Pt fully vaccinated for COVID.

## 2021-06-20 NOTE — H&P ADULT - HISTORY OF PRESENT ILLNESS
74 yo M w/ PMHx of CAD s/p stent (on ASA) and CABG, glaucoma, HTN, who is presenting for fever and weakness x 4 days associated with hypotension. Patient reports that he has had fevers and chills x 4 days. Has felt generally weak, lethargic. Laid in bed a lot. No focal signs localizing infection however, no sore throat, no cough, no diarrhea no nausea or vomiting, no dysuria, no chest pain, no rashes (has some hyperpigmentation that has been there for some time in groin folds but no rash). Lives with wife who has not had any fevers/chills. No recent dental work. Last colonoscopy 5-6 years ago with some polyps that were removed. No recent surgeries/travel/sick contacts. Had COVID19 vaccine second dose in February.

## 2021-06-21 DIAGNOSIS — H40.9 UNSPECIFIED GLAUCOMA: ICD-10-CM

## 2021-06-21 DIAGNOSIS — R50.9 FEVER, UNSPECIFIED: ICD-10-CM

## 2021-06-21 DIAGNOSIS — I25.10 ATHEROSCLEROTIC HEART DISEASE OF NATIVE CORONARY ARTERY WITHOUT ANGINA PECTORIS: ICD-10-CM

## 2021-06-21 DIAGNOSIS — I10 ESSENTIAL (PRIMARY) HYPERTENSION: ICD-10-CM

## 2021-06-21 LAB
ANION GAP SERPL CALC-SCNC: 12 MMOL/L — SIGNIFICANT CHANGE UP (ref 5–17)
ANION GAP SERPL CALC-SCNC: 15 MMOL/L — SIGNIFICANT CHANGE UP (ref 5–17)
BASOPHILS # BLD AUTO: 0 K/UL — SIGNIFICANT CHANGE UP (ref 0–0.2)
BASOPHILS NFR BLD AUTO: 0 % — SIGNIFICANT CHANGE UP (ref 0–2)
BLD GP AB SCN SERPL QL: NEGATIVE — SIGNIFICANT CHANGE UP
BUN SERPL-MCNC: 21 MG/DL — SIGNIFICANT CHANGE UP (ref 7–23)
BUN SERPL-MCNC: 21 MG/DL — SIGNIFICANT CHANGE UP (ref 7–23)
CALCIUM SERPL-MCNC: 8.3 MG/DL — LOW (ref 8.4–10.5)
CALCIUM SERPL-MCNC: 9 MG/DL — SIGNIFICANT CHANGE UP (ref 8.4–10.5)
CHLORIDE SERPL-SCNC: 101 MMOL/L — SIGNIFICANT CHANGE UP (ref 96–108)
CHLORIDE SERPL-SCNC: 103 MMOL/L — SIGNIFICANT CHANGE UP (ref 96–108)
CO2 SERPL-SCNC: 19 MMOL/L — LOW (ref 22–31)
CO2 SERPL-SCNC: 19 MMOL/L — LOW (ref 22–31)
COVID-19 SPIKE DOMAIN AB INTERP: POSITIVE
COVID-19 SPIKE DOMAIN ANTIBODY RESULT: >250 U/ML — HIGH
CREAT SERPL-MCNC: 1.56 MG/DL — HIGH (ref 0.5–1.3)
CREAT SERPL-MCNC: 1.71 MG/DL — HIGH (ref 0.5–1.3)
EOSINOPHIL # BLD AUTO: 0.05 K/UL — SIGNIFICANT CHANGE UP (ref 0–0.5)
EOSINOPHIL NFR BLD AUTO: 0.9 % — SIGNIFICANT CHANGE UP (ref 0–6)
GLUCOSE SERPL-MCNC: 110 MG/DL — HIGH (ref 70–99)
GLUCOSE SERPL-MCNC: 143 MG/DL — HIGH (ref 70–99)
HCT VFR BLD CALC: 29.3 % — LOW (ref 39–50)
HCT VFR BLD CALC: 35.5 % — LOW (ref 39–50)
HCV AB S/CO SERPL IA: 0.37 S/CO — SIGNIFICANT CHANGE UP (ref 0–0.99)
HCV AB SERPL-IMP: SIGNIFICANT CHANGE UP
HGB BLD-MCNC: 11.7 G/DL — LOW (ref 13–17)
HGB BLD-MCNC: 9.9 G/DL — LOW (ref 13–17)
IRON SATN MFR SERPL: 10 % — LOW (ref 16–55)
IRON SATN MFR SERPL: 26 UG/DL — LOW (ref 45–165)
LACTATE SERPL-SCNC: 1.3 MMOL/L — SIGNIFICANT CHANGE UP (ref 0.7–2)
LYMPHOCYTES # BLD AUTO: 0.45 K/UL — LOW (ref 1–3.3)
LYMPHOCYTES # BLD AUTO: 8.8 % — LOW (ref 13–44)
MAGNESIUM SERPL-MCNC: 2.2 MG/DL — SIGNIFICANT CHANGE UP (ref 1.6–2.6)
MCHC RBC-ENTMCNC: 31 PG — SIGNIFICANT CHANGE UP (ref 27–34)
MCHC RBC-ENTMCNC: 31.7 PG — SIGNIFICANT CHANGE UP (ref 27–34)
MCHC RBC-ENTMCNC: 33 GM/DL — SIGNIFICANT CHANGE UP (ref 32–36)
MCHC RBC-ENTMCNC: 33.8 GM/DL — SIGNIFICANT CHANGE UP (ref 32–36)
MCV RBC AUTO: 93.9 FL — SIGNIFICANT CHANGE UP (ref 80–100)
MCV RBC AUTO: 94.2 FL — SIGNIFICANT CHANGE UP (ref 80–100)
MONOCYTES # BLD AUTO: 0.67 K/UL — SIGNIFICANT CHANGE UP (ref 0–0.9)
MONOCYTES NFR BLD AUTO: 13.1 % — SIGNIFICANT CHANGE UP (ref 2–14)
NEUTROPHILS # BLD AUTO: 3.93 K/UL — SIGNIFICANT CHANGE UP (ref 1.8–7.4)
NEUTROPHILS NFR BLD AUTO: 74.6 % — SIGNIFICANT CHANGE UP (ref 43–77)
NRBC # BLD: 0 /100 WBCS — SIGNIFICANT CHANGE UP (ref 0–0)
PHOSPHATE SERPL-MCNC: 2.5 MG/DL — SIGNIFICANT CHANGE UP (ref 2.5–4.5)
PLATELET # BLD AUTO: 67 K/UL — LOW (ref 150–400)
PLATELET # BLD AUTO: 77 K/UL — LOW (ref 150–400)
POTASSIUM SERPL-MCNC: 3.9 MMOL/L — SIGNIFICANT CHANGE UP (ref 3.5–5.3)
POTASSIUM SERPL-MCNC: 4.6 MMOL/L — SIGNIFICANT CHANGE UP (ref 3.5–5.3)
POTASSIUM SERPL-SCNC: 3.9 MMOL/L — SIGNIFICANT CHANGE UP (ref 3.5–5.3)
POTASSIUM SERPL-SCNC: 4.6 MMOL/L — SIGNIFICANT CHANGE UP (ref 3.5–5.3)
RBC # BLD: 3.12 M/UL — LOW (ref 4.2–5.8)
RBC # BLD: 3.77 M/UL — LOW (ref 4.2–5.8)
RBC # BLD: 3.77 M/UL — LOW (ref 4.2–5.8)
RBC # FLD: 11.7 % — SIGNIFICANT CHANGE UP (ref 10.3–14.5)
RBC # FLD: 11.8 % — SIGNIFICANT CHANGE UP (ref 10.3–14.5)
RETICS #: 113.5 K/UL — SIGNIFICANT CHANGE UP (ref 25–125)
RETICS/RBC NFR: 3 % — HIGH (ref 0.5–2.5)
RH IG SCN BLD-IMP: POSITIVE — SIGNIFICANT CHANGE UP
SARS-COV-2 IGG+IGM SERPL QL IA: >250 U/ML — HIGH
SARS-COV-2 IGG+IGM SERPL QL IA: POSITIVE
SODIUM SERPL-SCNC: 132 MMOL/L — LOW (ref 135–145)
SODIUM SERPL-SCNC: 137 MMOL/L — SIGNIFICANT CHANGE UP (ref 135–145)
TIBC SERPL-MCNC: 249 UG/DL — SIGNIFICANT CHANGE UP (ref 220–430)
UIBC SERPL-MCNC: 223 UG/DL — SIGNIFICANT CHANGE UP (ref 110–370)
WBC # BLD: 5.09 K/UL — SIGNIFICANT CHANGE UP (ref 3.8–10.5)
WBC # BLD: 6.37 K/UL — SIGNIFICANT CHANGE UP (ref 3.8–10.5)
WBC # FLD AUTO: 5.09 K/UL — SIGNIFICANT CHANGE UP (ref 3.8–10.5)
WBC # FLD AUTO: 6.37 K/UL — SIGNIFICANT CHANGE UP (ref 3.8–10.5)

## 2021-06-21 PROCEDURE — 99222 1ST HOSP IP/OBS MODERATE 55: CPT

## 2021-06-21 PROCEDURE — 71260 CT THORAX DX C+: CPT | Mod: 26

## 2021-06-21 PROCEDURE — 74177 CT ABD & PELVIS W/CONTRAST: CPT | Mod: 26

## 2021-06-21 RX ORDER — MULTIVIT-MIN/FERROUS GLUCONATE 9 MG/15 ML
1 LIQUID (ML) ORAL
Qty: 0 | Refills: 0 | DISCHARGE

## 2021-06-21 RX ORDER — ASPIRIN/CALCIUM CARB/MAGNESIUM 324 MG
1 TABLET ORAL
Qty: 0 | Refills: 0 | DISCHARGE

## 2021-06-21 RX ORDER — ACETAMINOPHEN 500 MG
1000 TABLET ORAL ONCE
Refills: 0 | Status: COMPLETED | OUTPATIENT
Start: 2021-06-21 | End: 2021-06-21

## 2021-06-21 RX ORDER — ASPIRIN/CALCIUM CARB/MAGNESIUM 324 MG
81 TABLET ORAL DAILY
Refills: 0 | Status: DISCONTINUED | OUTPATIENT
Start: 2021-06-21 | End: 2021-06-24

## 2021-06-21 RX ORDER — SODIUM CHLORIDE 9 MG/ML
1000 INJECTION INTRAMUSCULAR; INTRAVENOUS; SUBCUTANEOUS
Refills: 0 | Status: DISCONTINUED | OUTPATIENT
Start: 2021-06-21 | End: 2021-06-23

## 2021-06-21 RX ORDER — SIMVASTATIN 20 MG/1
40 TABLET, FILM COATED ORAL AT BEDTIME
Refills: 0 | Status: DISCONTINUED | OUTPATIENT
Start: 2021-06-21 | End: 2021-06-24

## 2021-06-21 RX ORDER — ACETAMINOPHEN 500 MG
1 TABLET ORAL
Qty: 0 | Refills: 0 | DISCHARGE

## 2021-06-21 RX ORDER — SIMVASTATIN 20 MG/1
1 TABLET, FILM COATED ORAL
Qty: 0 | Refills: 0 | DISCHARGE

## 2021-06-21 RX ORDER — CHOLECALCIFEROL (VITAMIN D3) 125 MCG
1 CAPSULE ORAL
Qty: 0 | Refills: 0 | DISCHARGE

## 2021-06-21 RX ORDER — PIPERACILLIN AND TAZOBACTAM 4; .5 G/20ML; G/20ML
3.38 INJECTION, POWDER, LYOPHILIZED, FOR SOLUTION INTRAVENOUS EVERY 8 HOURS
Refills: 0 | Status: DISCONTINUED | OUTPATIENT
Start: 2021-06-21 | End: 2021-06-22

## 2021-06-21 RX ORDER — ACETAMINOPHEN 500 MG
650 TABLET ORAL EVERY 6 HOURS
Refills: 0 | Status: DISCONTINUED | OUTPATIENT
Start: 2021-06-21 | End: 2021-06-24

## 2021-06-21 RX ORDER — LATANOPROST 0.05 MG/ML
1 SOLUTION/ DROPS OPHTHALMIC; TOPICAL AT BEDTIME
Refills: 0 | Status: DISCONTINUED | OUTPATIENT
Start: 2021-06-21 | End: 2021-06-24

## 2021-06-21 RX ADMIN — SIMVASTATIN 40 MILLIGRAM(S): 20 TABLET, FILM COATED ORAL at 22:07

## 2021-06-21 RX ADMIN — SODIUM CHLORIDE 100 MILLILITER(S): 9 INJECTION INTRAMUSCULAR; INTRAVENOUS; SUBCUTANEOUS at 10:39

## 2021-06-21 RX ADMIN — Medication 100 MILLIGRAM(S): at 17:29

## 2021-06-21 RX ADMIN — Medication 650 MILLIGRAM(S): at 15:27

## 2021-06-21 RX ADMIN — Medication 400 MILLIGRAM(S): at 22:07

## 2021-06-21 RX ADMIN — Medication 1000 MILLIGRAM(S): at 23:00

## 2021-06-21 RX ADMIN — LATANOPROST 1 DROP(S): 0.05 SOLUTION/ DROPS OPHTHALMIC; TOPICAL at 01:47

## 2021-06-21 RX ADMIN — Medication 650 MILLIGRAM(S): at 17:35

## 2021-06-21 RX ADMIN — Medication 650 MILLIGRAM(S): at 05:00

## 2021-06-21 RX ADMIN — LATANOPROST 1 DROP(S): 0.05 SOLUTION/ DROPS OPHTHALMIC; TOPICAL at 22:07

## 2021-06-21 RX ADMIN — SODIUM CHLORIDE 60 MILLILITER(S): 9 INJECTION INTRAMUSCULAR; INTRAVENOUS; SUBCUTANEOUS at 23:35

## 2021-06-21 RX ADMIN — Medication 650 MILLIGRAM(S): at 07:42

## 2021-06-21 RX ADMIN — Medication 81 MILLIGRAM(S): at 11:15

## 2021-06-21 RX ADMIN — PIPERACILLIN AND TAZOBACTAM 25 GRAM(S): 4; .5 INJECTION, POWDER, LYOPHILIZED, FOR SOLUTION INTRAVENOUS at 11:02

## 2021-06-21 RX ADMIN — PIPERACILLIN AND TAZOBACTAM 25 GRAM(S): 4; .5 INJECTION, POWDER, LYOPHILIZED, FOR SOLUTION INTRAVENOUS at 01:47

## 2021-06-21 RX ADMIN — PIPERACILLIN AND TAZOBACTAM 25 GRAM(S): 4; .5 INJECTION, POWDER, LYOPHILIZED, FOR SOLUTION INTRAVENOUS at 17:29

## 2021-06-21 NOTE — CONSULT NOTE ADULT - SUBJECTIVE AND OBJECTIVE BOX
Charles Doyle MD  Interventional Cardiology / Endovascular Specialist  [place] Office : [phone] [place] [address] [place] [address]  Tel:   [place] Office : [address] [facility] [place] [address]  Tel: [phone]  Cell : [phone]      HISTORY OF PRESENTING ILLNESS:  74 yo M w/ PMHx of CAD s/p stent (on ASA) and CABG, glaucoma, HTN, who is presenting for fever and weakness x 4 days associated with hypotension. Patient reports that he has had fevers and chills x 4 days. Has felt generally weak, lethargic. Laid in bed a lot. No focal signs localizing infection however, no sore throat, no cough, no diarrhea no nausea or vomiting, no dysuria, no chest pain, no rashes (has some hyperpigmentation that has been there for some time in groin folds but no rash). Lives with wife who has not had any fevers/chills. No recent dental work. Last colonoscopy 5-6 years ago with some polyps that were removed. No recent surgeries/travel/sick contacts. Had COVID19 vaccine second dose in February.        PAST MEDICAL & SURGICAL HISTORY:  Hypercholesteremia    Colon Polyps    Hypertension    CAD (coronary artery disease)    H/O: glaucoma    Bilateral Cataracts  Patient had macular repair 2000.    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 No nausea, vomiting, or hematemesis; No diarrhea or constipation. No melena or hematochezia.  GENITOURINARY: No dysuria, frequency, hematuria, or incontinence  NEUROLOGICAL: No headaches, memory loss, loss of strength, numbness, or tremors  ENDOCRINE: No heat or cold intolerance; No hair loss  MUSCULOSKELETAL: No joint pain or swelling; No muscle, back, or extremity pain  PSYCHIATRIC: No depression, anxiety, mood swings, or difficulty sleeping  HEME/LYMPH: No easy bruising, or bleeding gums  All others negative    MEDICATIONS:  aspirin enteric coated 81 milliGRAM(s) Oral daily    doxycycline hyclate Capsule 100 milliGRAM(s) Oral every 12 hours  piperacillin/tazobactam IVPB.. 3.375 Gram(s) IV Intermittent every 8 hours      acetaminophen   Tablet .. 650 milliGRAM(s) Oral every 6 hours PRN      simvastatin 40 milliGRAM(s) Oral at bedtime    latanoprost 0.005% Ophthalmic Solution 1 Drop(s) Both EYES at bedtime  sodium chloride 0.9%. 1000 milliLiter(s) IV Continuous <Continuous>      FAMILY HISTORY:  No pertinent family history in first degree relatives          Allergies    No Known Allergies    Intolerances    	      PHYSICAL EXAM:  T(C): 39.8 (06-21-21 @ 23:00), Max: 40 (06-21-21 @ 21:43)  HR: 84 (06-21-21 @ 21:30) (62 - 84)  BP: 122/62 (06-21-21 @ 21:30) (98/58 - 124/57)  RR: 18 (06-21-21 @ 21:43) (17 - 22)  SpO2: 98% (06-21-21 @ 21:43) (86% - 98%)  Wt(kg): --  I&O's Summary    20 Jun 2021 07:01  -  21 Jun 2021 07:00  --------------------------------------------------------  IN: 0 mL / OUT: 1060 mL / NET: -1060 mL    21 Jun 2021 07:01  -  21 Jun 2021 23:18  --------------------------------------------------------  IN: 240 mL / OUT: 0 mL / NET: 240 mL        GENERAL: NAD  EYES: EOMI, PERRLA, conjunctiva and sclera clear  ENMT: No tonsillar erythema, exudates, or enlargement  Cardiovascular: Normal S1 S2, No JVD, No murmurs, No edema  Respiratory: Lungs clear to auscultation	  Gastrointestinal:  Soft, Non-tender, + BS	  Extremities: No edema      LABS:	 	    CARDIAC MARKERS:                                  11.7   6.37  )-----------( 77       ( 21 Jun 2021 10:22 )             35.5     06-21    137  |  103  |  21  ----------------------------<  143<H>  4.6   |  19<L>  |  1.71<H>    Ca    9.0      21 Jun 2021 10:22  Phos  2.5     06-21  Mg     2.2     06-21    TPro  7.6  /  Alb  3.4  /  TBili  0.9  /  DBili  x   /  AST  47<H>  /  ALT  20  /  AlkPhos  68  06-20    proBNP:   Lipid Profile:   HgA1c:   TSH:     Consultant(s) Notes Reviewed:  [x ] YES  [ ] NO    Care Discussed with Consultants/Other Providers [ x] YES  [ ] NO    Imaging Personally Reviewed independently:  [x] YES  [ ] NO    All labs, radiologic studies, vitals, orders and medications list reviewed. Patient is seen and examined at bedside. Case discussed with medical team.    ASSESSMENT/PLAN:

## 2021-06-21 NOTE — CONSULT NOTE ADULT - ASSESSMENT
74 yo M w/ PMHx of CAD s/p stent (on ASA) and CABG, glaucoma, HTN, who is presenting for fever and weakness x 4 days associated with hypotension.     Overall fever, relative hypotension, thrombocytopenia, ? anemia vs baseline.   mild transaminitis  given recent visit to [place], thrombocytopenia need to r/o tick borne infection.       Plan:   c/w zosyn for now while cx pending  added doxy  tick panel ordered  babesia PCR ordered.      74 yo M w/ PMHx of CAD s/p stent (on ASA) and CABG, glaucoma, HTN, who is presenting for fever and weakness x 4 days associated with hypotension.     Overall fever, relative hypotension, thrombocytopenia, ? anemia vs baseline.   mild transaminitis  given recent visit to [facility], thrombocytopenia need to r/o tick borne infection.       Plan:   c/w zosyn for now while cx pending  added doxy empirically   tick panel ordered  babesia PCR ordered.   follow up blood cx  follow up urine cx  EBV/CMV serologies ordered  trend CBC for thrombocytopenia.   trend Cr  LDH in am       Plan discussed with Medicine Attending.     All Cerrato  Pager: [phone]. If no response or past 5 pm call [phone].

## 2021-06-21 NOTE — PROGRESS NOTE ADULT - SUBJECTIVE AND OBJECTIVE BOX
Name of Patient : FRANCESCO BAUMAN  MRN: [id number]  DATE OF SERVICE: 21 @ 16:55    Subjective: Patient seen and examined. No new events except as noted.   doing okay  febrile again in the ED     REVIEW OF SYSTEMS:    CONSTITUTIONAL: No weakness, fevers or chills  EYES/ENT: No visual changes;  No vertigo or throat pain   NECK: No pain or stiffness  RESPIRATORY: No cough, wheezing, hemoptysis; No shortness of breath  CARDIOVASCULAR: No chest pain or palpitations  GASTROINTESTINAL: No abdominal or epigastric pain. No nausea, vomiting, or hematemesis; No diarrhea or constipation. No melena or hematochezia.  GENITOURINARY: No dysuria, frequency or hematuria  NEUROLOGICAL: No numbness or weakness  SKIN: No itching, burning, rashes, or lesions   All other review of systems is negative unless indicated above.    MEDICATIONS:  MEDICATIONS  (STANDING):  aspirin enteric coated 81 milliGRAM(s) Oral daily  doxycycline hyclate Capsule 100 milliGRAM(s) Oral every 12 hours  latanoprost 0.005% Ophthalmic Solution 1 Drop(s) Both EYES at bedtime  piperacillin/tazobactam IVPB.. 3.375 Gram(s) IV Intermittent every 8 hours  simvastatin 40 milliGRAM(s) Oral at bedtime  sodium chloride 0.9%. 1000 milliLiter(s) (100 mL/Hr) IV Continuous <Continuous>      PHYSICAL EXAM:  T(C): 39.5 (21 @ 15:45), Max: 39.5 (21 @ 15:24)  HR: 76 (21 @ 15:45) (62 - 82)  BP: 118/64 (21 @ 15:45) (97/52 - 124/57)  RR: 18 (21 @ 15:45) (17 - 18)  SpO2: 95% (21 @ 15:45) (92% - 98%)  Wt(kg): --  I&O's Summary    2021 07:01  -  2021 07:00  --------------------------------------------------------  IN: 0 mL / OUT: 1060 mL / NET: -1060 mL          Appearance: Normal	  HEENT:  PERRLA   Lymphatic: No lymphadenopathy   Cardiovascular: Normal S1 S2, no JVD  Respiratory: normal effort , clear  Gastrointestinal:  Soft, Non-tender  Skin: No rashes,  warm to touch  Psychiatry:  Mood & affect appropriate  Musculuskeletal: No edema      All labs, Imaging and EKGs personally reviewed       21 @ 07:01  -  21 @ 07:00  --------------------------------------------------------  IN: 0 mL / OUT: 1060 mL / NET: -1060 mL                          11.7   6.37  )-----------( 77       ( 2021 10:22 )             35.5               -    137  |  103  |  21  ----------------------------<  143<H>  4.6   |  19<L>  |  1.71<H>    Ca    9.0      2021 10:22  Phos  2.5     -  Mg     2.2         TPro  7.6  /  Alb  3.4  /  TBili  0.9  /  DBili  x   /  AST  47<H>  /  ALT  20  /  AlkPhos  68  06-20                       Urinalysis Basic - ( 2021 18:31 )    Color: Yellow / Appearance: Clear / S.011 / pH: x  Gluc: x / Ketone: Negative  / Bili: Negative / Urobili: Negative   Blood: x / Protein: Trace / Nitrite: Negative   Leuk Esterase: Negative / RBC: 2 /hpf / WBC 0 /HPF   Sq Epi: x / Non Sq Epi: 0 /hpf / Bacteria: Negative

## 2021-06-21 NOTE — CONSULT NOTE ADULT - SUBJECTIVE AND OBJECTIVE BOX
Patient is a 75y old  Male who presents with a chief complaint of Fever (20 Jun 2021 23:35)      HPI:  74 yo M w/ PMHx of CAD s/p stent (on ASA) and CABG, glaucoma, HTN, who is presenting for fever and weakness x 4 days associated with hypotension. Patient reports that he has had fevers and chills x 4 days. Has felt generally weak, lethargic. Laid in bed a lot. No focal signs localizing infection however, no sore throat, no cough, no diarrhea no nausea or vomiting, no dysuria, no chest pain, no rashes (has some hyperpigmentation that has been there for some time in groin folds but no rash). Lives with wife who has not had any fevers/chills. No recent dental work. Last colonoscopy 5-6 years ago with some polyps that were removed. No recent surgeries/travel/sick contacts. Had COVID19 vaccine second dose in February.   (20 Jun 2021 23:35)  Above reviewed:   Pt started feeling sick almost a week ago, no other symptoms except fever, fatigue and hypotension. No recent dental work. He did go to [employer] for a week almost a month ago.       PAST MEDICAL & SURGICAL HISTORY:  Hypercholesteremia    Colon Polyps    Hypertension    CAD (coronary artery disease)    H/O: glaucoma    Bilateral Cataracts  Patient had macular repair 2000.    S/P CABG X 4  6/2009    S/P Coronary Angiogram  with stent - 2001        REVIEW OF SYSTEMS    General: + Fevers     Skin: No rash  	  Ophthalmologic: Denies any discharge, redness.  	  ENT: No nasal congestion or throat pain.     Respiratory and Thorax: No cough, sputum. Denies shortness of breath.  	  Cardiovascular: No chest pain,     Gastrointestinal: No nausea, abdominal pain or diarrhea.    Genitourinary: No dysuria, frequency. No flank pain.    Musculoskeletal: No joint swelling    Neurological: No extremity weakness.    Psychiatric: No hallucinations	    Extremities: No swelling     Endocrine: No polyuria or polydipsia     Allergic/Immunologic:	No hives       Social history:  Lives with family, no smoking       FAMILY HISTORY:  No pertinent family history of CAD in first degree relatives        Allergies  No Known Allergies      Antimicrobials:  doxycycline hyclate Capsule 100 milliGRAM(s) Oral every 12 hours  piperacillin/tazobactam IVPB.. 3.375 Gram(s) IV Intermittent every 8 hours        Vital Signs Last 24 Hrs  T(C): 37.2 (21 Jun 2021 08:11), Max: 38.7 (21 Jun 2021 04:31)  T(F): 98.9 (21 Jun 2021 08:11), Max: 101.6 (21 Jun 2021 04:31)  HR: 62 (21 Jun 2021 08:11) (62 - 76)  BP: 104/59 (21 Jun 2021 08:11) (97/52 - 117/55)  BP(mean): 68 (20 Jun 2021 21:53) (66 - 74)  RR: 18 (21 Jun 2021 08:11) (17 - 18)  SpO2: 94% (21 Jun 2021 08:11) (92% - 98%)      PHYSICAL EXAM: Patient in no acute distress.    Constitutional: Comfortable. Awake and alert    Eyes: No discharge or conjunctival injection    ENT: No thrush. No pharyngeal exudate    Neck: Supple,     Respiratory: Good air entry bilaterally.    Cardiovascular: S1 S2 wnl,     Gastrointestinal: Soft BS(+) no tenderness, non distended.    Genitourinary: No CVA tenderness     Extremities: No edema.    Vascular: peripheral pulses felt    Neurological: AAO X 3. No grossly focal deficits.    Skin: No rash     Musculoskeletal: No joint swelling.    Psychiatric: Affect normal.                              11.7   6.37  )-----------( 77       ( 21 Jun 2021 10:22 )             35.5       06-21    137  |  103  |  21  ----------------------------<  143<H>  4.6   |  19<L>  |  1.71<H>    Ca    9.0      21 Jun 2021 10:22  Phos  2.5     06-21  Mg     2.2     06-21    TPro  7.6  /  Alb  3.4  /  TBili  0.9  /  DBili  x   /  AST  47<H>  /  ALT  20  /  AlkPhos  68  06-20      Urinalysis + Microscopic Examination (06.20.21 @ 18:31)   Urine Appearance: Clear   Urobilinogen: Negative   Specific Gravity: 1.011   Protein, Urine: Trace   pH Urine: 6.0   Leukocyte Esterase Concentration: Negative   Nitrite: Negative   Ketone - Urine: Negative   Bilirubin: Negative   Color: Yellow   Glucose Qualitative, Urine: Negative   Blood, Urine: Negative   Red Blood Cell - Urine: 2 /hpf   White Blood Cell - Urine: 0 /HPF   Epithelial Cells: 0 /hpf   Bacteria: Negative       Radiology: Imaging reviewed and visualized personally [ x]  < from: Xray Chest 1 View- PORTABLE-Urgent (Xray Chest 1 View- PORTABLE-Urgent .) (06.20.21 @ 16:42) >  IMPRESSION:    Right lower lung linear atelectasis but otherwise clear lungs.      < from: CT Chest w/ IV Cont (06.21.21 @ 05:17) >  IMPRESSION:    Several tree-in-bud nodular opacities in the left upper lobe. A 3 mm pulmonary nodule in the right upper lobe. Consider one-year CT follow-up.    A trace left pleural effusion.    Urinary bladder wall thickening versus underdistention. Correlate with urinalysis to exclude cystitis.    Moderate stool in the rectal vault.    Splenomegaly.

## 2021-06-21 NOTE — CONSULT NOTE ADULT - ASSESSMENT
76 yo M w/ PMHx of CAD s/p stent (on ASA) and CABG, glaucoma, HTN, who is presenting for fever and weakness x 4 days associated with hypotension.     [place] vs. [place] on CKD  Pt denies hx of CKD  presenting w/ fevers x 6 days  Possibly Pre-renal [place] in the setting of fever  vs. ATN From hypotension   Trial of IVF today  check urine eletrolytes   Will call pcp Lelia Maravilla for baseline SCr in  [phone]   No hydro on CT Scan   UA bland   avoid nephrotoxins     Acidosis   in the setting of [place]   monitor at present     Anemia  Hb stable    HTN  BP borderline low  optimize hemodynamics

## 2021-06-21 NOTE — CONSULT NOTE ADULT - ASSESSMENT
EKG: SR no ischemic changes     Echo: < from: Intraoperative Transesophageal Echo (06.11.09 @ 09:00) >   1. Normal left ventricular function.  2. Normal right ventricular size and systolic function.  A  catheter is visualized in the right ventricle.  3. Moderate aortic regurgitation.  Vena contracta width  about 0.4 cm.  4. Normal aortic root. Mild non-mobile atherosclerotic  plaque in the aortic arch and descending thoracic aorta.  An intra-aortic balloon pump is visualized in the  descending thoracic aorta.  5. Normal left atrium.  No left atrium or left atrial  appendage thrombus.    < end of copied text >    Assessment and Plan     1) CAD s/p CABG denies CP c/w asa and simvastatin , pt follows with Dr. Coffey , can get echo given reported Hypotension     2) Fever : ID on board f/u recs , on abx     3) DVT PPx recommend Heparin

## 2021-06-21 NOTE — CHART NOTE - NSCHARTNOTEFT_GEN_A_CORE
MEDICINE NP    FRANCESCO BAUMAN  75y Male    Patient is a 75y old  Male who presents with a chief complaint of Fever (21 Jun 2021 17:29)       > Event Summary:  Notified by RN, Patient with         -Vital Signs Last 24 Hrs  T(C): 40 (21 Jun 2021 21:43), Max: 40 (21 Jun 2021 21:43)  T(F): 104 (21 Jun 2021 21:43), Max: 104 (21 Jun 2021 21:43)  HR: 84 (21 Jun 2021 21:30) (62 - 84)  BP: 122/62 (21 Jun 2021 21:30) (98/58 - 124/57)  RR: 18 (21 Jun 2021 21:43) (17 - 22)  SpO2: 98% (21 Jun 2021 21:43) (86% - 98%)      > Assessment & Plan:  - HPI:     -Plan:               EDDIE Reyez-BC  Medicine Department MEDICINE NP    FRANCESCO BAUMAN  75y Male    Patient is a 75y old  Male who presents with a chief complaint of Fever (21 Jun 2021 17:29)       > Event Summary:  Notified by RN, Patient noted with rigors, oral T-99.1, rechecked rectally T-104.  Ice packs applied  Patient seen at bedside, AAOx3, rigors resolved.  Denies feverish, chills, sob, abdominal pain, dysuria.     -Vital Signs Last 24 Hrs  T(C): 40 (21 Jun 2021 21:43), Max: 40 (21 Jun 2021 21:43)  T(F): 104 (21 Jun 2021 21:43), Max: 104 (21 Jun 2021 21:43)  HR: 84 (21 Jun 2021 21:30) (62 - 84)  BP: 122/62 (21 Jun 2021 21:30) (98/58 - 124/57)  RR: 18 (21 Jun 2021 21:43) (17 - 22)  SpO2: 98% (21 Jun 2021 21:43) (86% - 98%)      > Assessment & Plan:  - HPI: 74 yo M w/ PMHx of CAD s/p stent (on ASA) and CABG, glaucoma, HTN, who is presenting for fever and weakness x 4 days associated with hypotension. Patient reports that he has had fevers and chills x 4 days. Has felt generally weak, lethargic. Laid in bed a lot. No focal signs localizing infection.  Admitted with Fever of unknown origin on Zosyn IV.  Now with recurrent fever,     Fever- Recurrent   -Tylenol IV now & Hypothermic blanket (till T-99)  -c/w IVF   -F/u BCX (06.20.21 @ 21:03) No growth to date.  -Culture - Urine (06.20.21 @ 21:59) <10,000 CFU/mL Normal Urogenital Nataliia  -C/w ABX regimen of Zosyn   -C/w Doxycycline for concerning for Lyme   -ID following  -D/w Attending, Dr. Rios, request to lower IVF to 60ml/hr, and c/w monitoring  -Will monitor closely and endorse to day team      EDDIE Reyez-BC  Medicine Department  #[id number]

## 2021-06-21 NOTE — CONSULT NOTE ADULT - SUBJECTIVE AND OBJECTIVE BOX
[facility]   MD Rob Hernandez MD, D.O.  KARELY Connell    From 7 AM - 5 PM:  OFFICE: [phone]  Dr. Ortega cell: [phone]  Dr. Arnold Cell: [phone]  Dr. Guerrero cell: [phone]  KARELY Fabian cell: [phone]    From 5 PM - 7 AM: Answering Service: [phone]  Date of service 06-21-21 @ 17:29    -- INITIAL RENAL CONSULT NOTE  --------------------------------------------------------------------------------  HPI:  76 yo M w/ PMHx of CAD s/p stent (on ASA) and CABG, glaucoma, HTN, who is presenting for fever and weakness x 4 days associated with hypotension. Patient reports that he has had fevers and chills x 4 days. Has felt generally weak, lethargic. Laid in bed a lot. No focal signs localizing infection however, no sore throat, no cough, no diarrhea no nausea or vomiting, no dysuria, no chest pain, no rashes (has some hyperpigmentation that has been there for some time in groin folds but no rash). Lives with wife who has not had any fevers/chills. No recent dental work. Last colonoscopy 5-6 years ago with some polyps that were removed. No recent surgeries/travel/sick contacts. Had COVID19 vaccine second dose in February.  Nephrology consulted for [place]. PT denies hx of renal failure/ CKD in the past. Denies recent abx/ NSAIDs/ medication changes/ nausea/ vomiting/ diarrhea   Follows w. PCP Dr. Lelia Maravilla     PAST HISTORY  --------------------------------------------------------------------------------  PAST MEDICAL & SURGICAL HISTORY:  Hypercholesteremia    Colon Polyps    Hypertension    CAD (coronary artery disease)    H/O: glaucoma    Bilateral Cataracts  Patient had macular repair 2000.    S/P CABG X 4  6/2009    S/P Coronary Angiogram  with stent - 2001      FAMILY HISTORY:  No pertinent family history in first degree relatives      PAST SOCIAL HISTORY:    ALLERGIES & MEDICATIONS  --------------------------------------------------------------------------------  Allergies    No Known Allergies    Intolerances      Standing Inpatient Medications  aspirin enteric coated 81 milliGRAM(s) Oral daily  doxycycline hyclate Capsule 100 milliGRAM(s) Oral every 12 hours  latanoprost 0.005% Ophthalmic Solution 1 Drop(s) Both EYES at bedtime  piperacillin/tazobactam IVPB.. 3.375 Gram(s) IV Intermittent every 8 hours  simvastatin 40 milliGRAM(s) Oral at bedtime  sodium chloride 0.9%. 1000 milliLiter(s) IV Continuous <Continuous>    PRN Inpatient Medications  acetaminophen   Tablet .. 650 milliGRAM(s) Oral every 6 hours PRN      REVIEW OF SYSTEMS  --------------------------------------------------------------------------------  Gen: No fevers/chills  Skin: No rashes  Head/Eyes/Ears: Normal hearing,  Normal vision   Respiratory: No dyspnea, cough  CV: No chest pain  GI: No abdominal pain, diarrhea, constipation, nausea, vomiting  : No dysuria, hematuria  MSK: No  edema  Heme: No easy bruising or bleeding  Psych: No significant depression    All other systems were reviewed and are negative, except as noted.    VITALS/PHYSICAL EXAM  --------------------------------------------------------------------------------  T(C): 39.5 (06-21-21 @ 15:45), Max: 39.5 (06-21-21 @ 15:24)  HR: 76 (06-21-21 @ 15:45) (62 - 82)  BP: 118/64 (06-21-21 @ 15:45) (97/52 - 124/57)  RR: 18 (06-21-21 @ 15:45) (17 - 18)  SpO2: 95% (06-21-21 @ 15:45) (92% - 98%)  Wt(kg): --  Height (cm): 160 (06-20-21 @ 14:34)  Weight (kg): 59 (06-20-21 @ 14:34)  BMI (kg/m2): 23 (06-20-21 @ 14:34)  BSA (m2): 1.61 (06-20-21 @ 14:34)      06-20-21 @ 07:01  -  06-21-21 @ 07:00  --------------------------------------------------------  IN: 0 mL / OUT: 1060 mL / NET: -1060 mL      Physical Exam:  	Gen: NAD  	HEENT: MMM  	Pulm: CTA B/L  	CV: S1S2  	Abd: Soft, +BS   	Ext: No LE edema B/L  	Neuro: Awake  	Skin: Warm and dry  	    LABS/STUDIES  --------------------------------------------------------------------------------              11.7   6.37  >-----------<  77       [06-21-21 @ 10:22]              35.5     137  |  103  |  21  ----------------------------<  143      [06-21-21 @ 10:22]  4.6   |  19  |  1.71        Ca     9.0     [06-21-21 @ 10:22]      Mg     2.2     [06-21-21 @ 10:22]      Phos  2.5     [06-21-21 @ 10:22]    TPro  7.6  /  Alb  3.4  /  TBili  0.9  /  DBili  x   /  AST  47  /  ALT  20  /  AlkPhos  68  [06-20-21 @ 16:50]      Creatinine Trend:  SCr 1.71 [06-21 @ 10:22]  SCr 1.56 [06-21 @ 06:11]  SCr 1.59 [06-20 @ 16:50]    Urinalysis - [06-20-21 @ 18:31]      Color Yellow / Appearance Clear / SG 1.011 / pH 6.0      Gluc Negative / Ketone Negative  / Bili Negative / Urobili Negative       Blood Negative / Protein Trace / Leuk Est Negative / Nitrite Negative      RBC 2 / WBC 0 / Hyaline  / Gran  / Sq Epi  / Non Sq Epi 0 / Bacteria Negative        HCV 0.37, Nonreact      [06-21-21 @ 08:44]

## 2021-06-22 LAB
ALBUMIN SERPL ELPH-MCNC: 2.8 G/DL — LOW (ref 3.3–5)
ALP SERPL-CCNC: 66 U/L — SIGNIFICANT CHANGE UP (ref 40–120)
ALT FLD-CCNC: 22 U/L — SIGNIFICANT CHANGE UP (ref 10–45)
ANION GAP SERPL CALC-SCNC: 12 MMOL/L — SIGNIFICANT CHANGE UP (ref 5–17)
AST SERPL-CCNC: 55 U/L — HIGH (ref 10–40)
B BURGDOR C6 AB SER-ACNC: POSITIVE
B BURGDOR IGG+IGM SER-ACNC: 1.83 INDEX — HIGH (ref 0.01–0.89)
BASOPHILS # BLD AUTO: 0.04 K/UL — SIGNIFICANT CHANGE UP (ref 0–0.2)
BASOPHILS NFR BLD AUTO: 0.6 % — SIGNIFICANT CHANGE UP (ref 0–2)
BILIRUB SERPL-MCNC: 0.8 MG/DL — SIGNIFICANT CHANGE UP (ref 0.2–1.2)
BUN SERPL-MCNC: 23 MG/DL — SIGNIFICANT CHANGE UP (ref 7–23)
CALCIUM SERPL-MCNC: 8.8 MG/DL — SIGNIFICANT CHANGE UP (ref 8.4–10.5)
CHLORIDE SERPL-SCNC: 106 MMOL/L — SIGNIFICANT CHANGE UP (ref 96–108)
CHLORIDE UR-SCNC: 45 MMOL/L — SIGNIFICANT CHANGE UP
CMV IGM FLD-ACNC: 13 AU/ML — SIGNIFICANT CHANGE UP
CMV IGM SERPL QL: NEGATIVE — SIGNIFICANT CHANGE UP
CO2 SERPL-SCNC: 19 MMOL/L — LOW (ref 22–31)
CREAT ?TM UR-MCNC: 98 MG/DL — SIGNIFICANT CHANGE UP
CREAT SERPL-MCNC: 1.71 MG/DL — HIGH (ref 0.5–1.3)
CULTURE RESULTS: SIGNIFICANT CHANGE UP
CULTURE RESULTS: SIGNIFICANT CHANGE UP
EBV EA AB SER IA-ACNC: <5 U/ML — SIGNIFICANT CHANGE UP
EBV EA AB TITR SER IF: POSITIVE
EBV EA IGG SER-ACNC: NEGATIVE — SIGNIFICANT CHANGE UP
EBV NA IGG SER IA-ACNC: >600 U/ML — HIGH
EBV PATRN SPEC IB-IMP: SIGNIFICANT CHANGE UP
EBV VCA IGG AVIDITY SER QL IA: POSITIVE
EBV VCA IGM SER IA-ACNC: <10 U/ML — SIGNIFICANT CHANGE UP
EBV VCA IGM SER IA-ACNC: >750 U/ML — HIGH
EBV VCA IGM TITR FLD: NEGATIVE — SIGNIFICANT CHANGE UP
EOSINOPHIL # BLD AUTO: 0.04 K/UL — SIGNIFICANT CHANGE UP (ref 0–0.5)
EOSINOPHIL NFR BLD AUTO: 0.6 % — SIGNIFICANT CHANGE UP (ref 0–6)
FERRITIN SERPL-MCNC: 1446 NG/ML — HIGH (ref 30–400)
GLUCOSE SERPL-MCNC: 126 MG/DL — HIGH (ref 70–99)
HCT VFR BLD CALC: 33.9 % — LOW (ref 39–50)
HGB BLD-MCNC: 11 G/DL — LOW (ref 13–17)
IMM GRANULOCYTES NFR BLD AUTO: 0.6 % — SIGNIFICANT CHANGE UP (ref 0–1.5)
LDH SERPL L TO P-CCNC: 470 U/L — HIGH (ref 50–242)
LEGIONELLA AG UR QL: NEGATIVE — SIGNIFICANT CHANGE UP
LYME C6 AB IGG/IGM EIA REFLEX WESTERN BL: SIGNIFICANT CHANGE UP
LYMPHOCYTES # BLD AUTO: 1.05 K/UL — SIGNIFICANT CHANGE UP (ref 1–3.3)
LYMPHOCYTES # BLD AUTO: 15.6 % — SIGNIFICANT CHANGE UP (ref 13–44)
MCHC RBC-ENTMCNC: 31.2 PG — SIGNIFICANT CHANGE UP (ref 27–34)
MCHC RBC-ENTMCNC: 32.4 GM/DL — SIGNIFICANT CHANGE UP (ref 32–36)
MCV RBC AUTO: 96 FL — SIGNIFICANT CHANGE UP (ref 80–100)
MONOCYTES # BLD AUTO: 0.83 K/UL — SIGNIFICANT CHANGE UP (ref 0–0.9)
MONOCYTES NFR BLD AUTO: 12.3 % — SIGNIFICANT CHANGE UP (ref 2–14)
NEUTROPHILS # BLD AUTO: 4.73 K/UL — SIGNIFICANT CHANGE UP (ref 1.8–7.4)
NEUTROPHILS NFR BLD AUTO: 70.3 % — SIGNIFICANT CHANGE UP (ref 43–77)
NRBC # BLD: 0 /100 WBCS — SIGNIFICANT CHANGE UP (ref 0–0)
OSMOLALITY UR: 491 MOS/KG — SIGNIFICANT CHANGE UP (ref 300–900)
PLATELET # BLD AUTO: 74 K/UL — LOW (ref 150–400)
POTASSIUM SERPL-MCNC: 4.3 MMOL/L — SIGNIFICANT CHANGE UP (ref 3.5–5.3)
POTASSIUM SERPL-SCNC: 4.3 MMOL/L — SIGNIFICANT CHANGE UP (ref 3.5–5.3)
PROT ?TM UR-MCNC: 63 MG/DL — HIGH (ref 0–12)
PROT SERPL-MCNC: 7 G/DL — SIGNIFICANT CHANGE UP (ref 6–8.3)
PROT/CREAT UR-RTO: 0.6 RATIO — HIGH (ref 0–0.2)
RBC # BLD: 3.53 M/UL — LOW (ref 4.2–5.8)
RBC # FLD: 12.1 % — SIGNIFICANT CHANGE UP (ref 10.3–14.5)
SODIUM SERPL-SCNC: 137 MMOL/L — SIGNIFICANT CHANGE UP (ref 135–145)
SODIUM UR-SCNC: 49 MMOL/L — SIGNIFICANT CHANGE UP
SPECIMEN SOURCE: SIGNIFICANT CHANGE UP
SPECIMEN SOURCE: SIGNIFICANT CHANGE UP
WBC # BLD: 6.73 K/UL — SIGNIFICANT CHANGE UP (ref 3.8–10.5)
WBC # FLD AUTO: 6.73 K/UL — SIGNIFICANT CHANGE UP (ref 3.8–10.5)

## 2021-06-22 PROCEDURE — 99232 SBSQ HOSP IP/OBS MODERATE 35: CPT

## 2021-06-22 RX ORDER — ATOVAQUONE 750 MG/5ML
750 SUSPENSION ORAL EVERY 12 HOURS
Refills: 0 | Status: DISCONTINUED | OUTPATIENT
Start: 2021-06-22 | End: 2021-06-24

## 2021-06-22 RX ORDER — AZITHROMYCIN 500 MG/1
250 TABLET, FILM COATED ORAL DAILY
Refills: 0 | Status: DISCONTINUED | OUTPATIENT
Start: 2021-06-23 | End: 2021-06-24

## 2021-06-22 RX ORDER — ACETAMINOPHEN 500 MG
1000 TABLET ORAL ONCE
Refills: 0 | Status: COMPLETED | OUTPATIENT
Start: 2021-06-22 | End: 2021-06-22

## 2021-06-22 RX ORDER — ACETAMINOPHEN 500 MG
975 TABLET ORAL ONCE
Refills: 0 | Status: DISCONTINUED | OUTPATIENT
Start: 2021-06-22 | End: 2021-06-22

## 2021-06-22 RX ORDER — AZITHROMYCIN 500 MG/1
500 TABLET, FILM COATED ORAL ONCE
Refills: 0 | Status: COMPLETED | OUTPATIENT
Start: 2021-06-22 | End: 2021-06-22

## 2021-06-22 RX ADMIN — SODIUM CHLORIDE 60 MILLILITER(S): 9 INJECTION INTRAMUSCULAR; INTRAVENOUS; SUBCUTANEOUS at 11:59

## 2021-06-22 RX ADMIN — Medication 400 MILLIGRAM(S): at 16:20

## 2021-06-22 RX ADMIN — LATANOPROST 1 DROP(S): 0.05 SOLUTION/ DROPS OPHTHALMIC; TOPICAL at 21:37

## 2021-06-22 RX ADMIN — SODIUM CHLORIDE 60 MILLILITER(S): 9 INJECTION INTRAMUSCULAR; INTRAVENOUS; SUBCUTANEOUS at 18:01

## 2021-06-22 RX ADMIN — AZITHROMYCIN 250 MILLIGRAM(S): 500 TABLET, FILM COATED ORAL at 18:01

## 2021-06-22 RX ADMIN — PIPERACILLIN AND TAZOBACTAM 25 GRAM(S): 4; .5 INJECTION, POWDER, LYOPHILIZED, FOR SOLUTION INTRAVENOUS at 02:09

## 2021-06-22 RX ADMIN — SIMVASTATIN 40 MILLIGRAM(S): 20 TABLET, FILM COATED ORAL at 21:37

## 2021-06-22 RX ADMIN — Medication 81 MILLIGRAM(S): at 11:58

## 2021-06-22 RX ADMIN — Medication 100 MILLIGRAM(S): at 05:06

## 2021-06-22 RX ADMIN — Medication 100 MILLIGRAM(S): at 18:01

## 2021-06-22 RX ADMIN — ATOVAQUONE 750 MILLIGRAM(S): 750 SUSPENSION ORAL at 18:01

## 2021-06-22 RX ADMIN — PIPERACILLIN AND TAZOBACTAM 25 GRAM(S): 4; .5 INJECTION, POWDER, LYOPHILIZED, FOR SOLUTION INTRAVENOUS at 11:58

## 2021-06-22 NOTE — PROGRESS NOTE ADULT - ASSESSMENT
76 yo M w/ PMHx of CAD s/p stent (on ASA) and CABG, glaucoma, HTN, who is presenting for fever and weakness x 4 days associated with hypotension.     Overall fever, relative hypotension, thrombocytopenia, ? anemia vs baseline.   mild transaminitis  given recent visit to [facility], thrombocytopenia need to r/o tick borne infection.   Babesia PCR +     Plan:   stopped zosyn   c/w doxy for now, pending anaplasma/ehrlichia serologies,   started Azithromycin and mepron for babesia.   parasetemia ordered   tick panel pending.   babesia PCR +  follow up blood cx, NTD   follow up urine cx, NTD   EBV/CMV serologies pending   trend CBC for thrombocytopenia.   trend Cr  LDH elevated       Plan discussed with Medicine Attending.     All Cerrato  Pager: [phone]. If no response or past 5 pm call [phone].     My colleague will cover 6/23.

## 2021-06-22 NOTE — PROGRESS NOTE ADULT - SUBJECTIVE AND OBJECTIVE BOX
Name of Patient : FRANCESCO BAUMAN  MRN: [id number]  DATE OF SERVICE: 06-22-21 @ 19:42    Subjective: Patient seen and examined. No new events except as noted.   cont ot be febrile      REVIEW OF SYSTEMS:    CONSTITUTIONAL: + fever   EYES/ENT: No visual changes;  No vertigo or throat pain   NECK: No pain or stiffness  RESPIRATORY: No cough, wheezing, hemoptysis; No shortness of breath  CARDIOVASCULAR: No chest pain or palpitations  GASTROINTESTINAL: No abdominal or epigastric pain. No nausea, vomiting, or hematemesis; No diarrhea or constipation. No melena or hematochezia.  GENITOURINARY: No dysuria, frequency or hematuria  NEUROLOGICAL: No numbness or weakness  SKIN: No itching, burning, rashes, or lesions   All other review of systems is negative unless indicated above.    MEDICATIONS:  MEDICATIONS  (STANDING):  aspirin enteric coated 81 milliGRAM(s) Oral daily  atovaquone  Suspension 750 milliGRAM(s) Oral every 12 hours  doxycycline hyclate Capsule 100 milliGRAM(s) Oral every 12 hours  latanoprost 0.005% Ophthalmic Solution 1 Drop(s) Both EYES at bedtime  simvastatin 40 milliGRAM(s) Oral at bedtime  sodium chloride 0.9%. 1000 milliLiter(s) (60 mL/Hr) IV Continuous <Continuous>      PHYSICAL EXAM:  T(C): 37.9 (06-22-21 @ 17:17), Max: 40 (06-21-21 @ 21:43)  HR: 72 (06-22-21 @ 18:20) (62 - 84)  BP: 90/49 (06-22-21 @ 18:20) (90/49 - 122/62)  RR: 18 (06-22-21 @ 18:20) (18 - 22)  SpO2: 94% (06-22-21 @ 18:20) (86% - 98%)  Wt(kg): --  I&O's Summary    21 Jun 2021 07:01  -  22 Jun 2021 07:00  --------------------------------------------------------  IN: 240 mL / OUT: 0 mL / NET: 240 mL          Appearance: Normal	  HEENT:  PERRLA   Lymphatic: No lymphadenopathy   Cardiovascular: Normal S1 S2, no JVD  Respiratory: normal effort , clear  Gastrointestinal:  Soft, Non-tender  Skin: No rashes,  warm to touch  Psychiatry:  Mood & affect appropriate  Musculuskeletal: No edema      All labs, Imaging and EKGs personally reviewed     Babesia microti PCR, Blood. (collected 06-22-21 @ 09:07)  Source: .Blood  Final Report (06-22-21 @ 11:42):    Babesia microti PCR    Results: DETECTED    ***************Result Note*************    Parasitemia is not routinely performed.    If needed, call the microbiology laboratory.    Order "Parasitemia, Blood" for monitoring.    The detection of Babesia microti by PCR has only been    validated for whole blood; this test has not been approved    by the US Food and Drug Administration (FDA). Performance    characteristics of this assay have been determined by    [employer]. The clinical significance    of results should be considered in conjunction with the    overall clinical presentation of the patient. Result is not    intended to be used as the sole means for clinical diagnosis    or patient management decisions.          06-21-21 @ 07:01  -  06-22-21 @ 07:00  --------------------------------------------------------  IN: 240 mL / OUT: 0 mL / NET: 240 mL                            11.0   6.73  )-----------( 74       ( 22 Jun 2021 06:30 )             33.9               06-22    137  |  106  |  23  ----------------------------<  126<H>  4.3   |  19<L>  |  1.71<H>    Ca    8.8      22 Jun 2021 06:30  Phos  2.5     06-21  Mg     2.2     06-21    TPro  7.0  /  Alb  2.8<L>  /  TBili  0.8  /  DBili  x   /  AST  55<H>  /  ALT  22  /  AlkPhos  66  06-22                       < from: CT Abdomen and Pelvis w/ Oral Cont and w/ IV Cont (06.21.21 @ 05:18) >  IMPRESSION:    Several tree-in-bud nodular opacities in the left upper lobe. A 3 mm pulmonary nodule in the right upper lobe. Consider one-year CT follow-up.    A trace left pleural effusion.    Urinary bladder wall thickening versus underdistention. Correlate with urinalysis to exclude cystitis.    Moderate stool in the rectal vault.    Splenomegaly.

## 2021-06-22 NOTE — PROGRESS NOTE ADULT - SUBJECTIVE AND OBJECTIVE BOX
[facility] [place] [facility]   MD Rob Hernandez MD, D.O. Ruoru Wong, PA    From 7 AM - 5 PM:  OFFICE: [phone]  Dr. Ortega cell: [phone]  Dr. Arnold cell: [phone]  Dr. Guerrero cell: [phone]  KARELY Fabian cell: [phone]    From 5 PM - 7 AM: Answering Service: [phone]  Date of service: 06-22-21 @ 12:03    RENAL FOLLOW UP NOTE  --------------------------------------------------------------------------------  HPI:  Pt seen and examined at bedside.       PAST HISTORY  --------------------------------------------------------------------------------  No significant changes to PMH, PSH, FHx, SHx, unless otherwise noted    ALLERGIES & MEDICATIONS  --------------------------------------------------------------------------------  Allergies    No Known Allergies    Intolerances      Standing Inpatient Medications  aspirin enteric coated 81 milliGRAM(s) Oral daily  doxycycline hyclate Capsule 100 milliGRAM(s) Oral every 12 hours  latanoprost 0.005% Ophthalmic Solution 1 Drop(s) Both EYES at bedtime  piperacillin/tazobactam IVPB.. 3.375 Gram(s) IV Intermittent every 8 hours  simvastatin 40 milliGRAM(s) Oral at bedtime  sodium chloride 0.9%. 1000 milliLiter(s) IV Continuous <Continuous>    PRN Inpatient Medications  acetaminophen   Tablet .. 650 milliGRAM(s) Oral every 6 hours PRN      REVIEW OF SYSTEMS  --------------------------------------------------------------------------------  General: no fever  CVS: no chest pain  RESP: no sob, no cough  ABD: no abdominal pain  : no dysuria,  MSK: no edema     VITALS/PHYSICAL EXAM  --------------------------------------------------------------------------------  T(C): 37.8 (06-22-21 @ 10:30), Max: 40 (06-21-21 @ 21:43)  HR: 79 (06-22-21 @ 10:30) (62 - 84)  BP: 96/54 (06-22-21 @ 10:30) (94/54 - 124/57)  RR: 19 (06-22-21 @ 10:30) (17 - 22)  SpO2: 92% (06-22-21 @ 10:30) (86% - 98%)  Wt(kg): --  Height (cm): 160 (06-20-21 @ 14:34)  Weight (kg): 59 (06-20-21 @ 14:34)  BMI (kg/m2): 23 (06-20-21 @ 14:34)  BSA (m2): 1.61 (06-20-21 @ 14:34)      06-21-21 @ 07:01  -  06-22-21 @ 07:00  --------------------------------------------------------  IN: 240 mL / OUT: 0 mL / NET: 240 mL      Physical Exam:  	Gen: NAD  	HEENT: MMM  	Pulm: CTA B/L  	CV: S1S2  	Abd: Soft, +BS  	Ext: No LE edema B/L                      Neuro: Awake   	Skin: Warm and Dry       LABS/STUDIES  --------------------------------------------------------------------------------              11.0   6.73  >-----------<  74       [06-22-21 @ 06:30]              33.9     137  |  106  |  23  ----------------------------<  126      [06-22-21 @ 06:30]  4.3   |  19  |  1.71        Ca     8.8     [06-22-21 @ 06:30]      Mg     2.2     [06-21-21 @ 10:22]      Phos  2.5     [06-21-21 @ 10:22]    TPro  7.0  /  Alb  2.8  /  TBili  0.8  /  DBili  x   /  AST  55  /  ALT  22  /  AlkPhos  66  [06-22-21 @ 06:30]              [06-22-21 @ 06:30]    Creatinine Trend:  SCr 1.71 [06-22 @ 06:30]  SCr 1.71 [06-21 @ 10:22]  SCr 1.56 [06-21 @ 06:11]  SCr 1.59 [06-20 @ 16:50]    Urinalysis - [06-20-21 @ 18:31]      Color Yellow / Appearance Clear / SG 1.011 / pH 6.0      Gluc Negative / Ketone Negative  / Bili Negative / Urobili Negative       Blood Negative / Protein Trace / Leuk Est Negative / Nitrite Negative      RBC 2 / WBC 0 / Hyaline  / Gran  / Sq Epi  / Non Sq Epi 0 / Bacteria Negative      Iron 26, TIBC 249, %sat 10      [06-21-21 @ 15:36]    HCV 0.37, Nonreact      [06-21-21 @ 08:44]

## 2021-06-22 NOTE — PROGRESS NOTE ADULT - ASSESSMENT
76 yo M w/ PMHx of CAD s/p stent (on ASA) and CABG, glaucoma, HTN, who is presenting for fever and weakness x 4 days associated with hypotension.

## 2021-06-22 NOTE — PROVIDER CONTACT NOTE (OTHER) - NAME OF MD/NP/PA/DO NOTIFIED:
Araceli Isidro NP
SONYA dooley
SONYA dooley
Araceli Isidro NP
Yin, NP
SONYA Meadows
Araceli Isidro NP

## 2021-06-22 NOTE — PROVIDER CONTACT NOTE (OTHER) - DATE AND TIME:
21-Jun-2021 21:47
22-Jun-2021 00:35
21-Jun-2021 15:25
22-Jun-2021 18:27
21-Jun-2021 04:44
22-Jun-2021 16:01
21-Jun-2021 23:15

## 2021-06-22 NOTE — PROVIDER CONTACT NOTE (OTHER) - ASSESSMENT
Pt A+Ox4, asymptomatic  denies HA, CP, SOB, N/V/D, dizziness, VS: 105/53, HR 76
Pt A+Ox4, asymptomatic  denies HA, CP, SOB, N/V/D, dizziness. Pt currently receiving 60ml NS continuously.
patient a/ox4, feels warm to touch and c/o chills.
Pt A+Ox4 denies SOB, CP, HA, dizziness, chills, N/V/D.
Pt AOx4, denies any pain SOB or distress. No dizziness or lightheadedness, no chills. Other VSS
Pt A+Ox4, asymptomatic  denies HA, CP, SOB, N/V/D, dizziness. NS decreased from 100ml/hr to 60ml/hr.
Pt A+Ox4, p/w chills, denies any HA, CP, SOB, N/V/D.

## 2021-06-22 NOTE — PROVIDER CONTACT NOTE (OTHER) - RECOMMENDATIONS
Notify NP, PO Tylenol
Notify Provider, Apply ice packs?
Notify Provider, NC 2L?, Tylenol?
Notify Provider
make NP aware, give IV tylenol and apply cooling measures. continue to monitor.

## 2021-06-22 NOTE — PROGRESS NOTE ADULT - ASSESSMENT
Assessment and Plan    74 yo M w/ PMHx of CAD s/p stent (on ASA) and CABG, glaucoma, HTN, who is presenting for fever and weakness x 4 days associated with hypotension.    EKG: SR no ischemic changes    1) CAD s/p CABG   -denies CP   -c/w asa and simvastatin  -pt follows with Dr. Coffey  -can get echo given reported Hypotension     2) Fever  - ID on board   -f/u recs  -on abx     3) DVT PPX  - recommend Heparin    Assessment and Plan    74 yo M w/ PMHx of CAD s/p stent (on ASA) and CABG, glaucoma, HTN, who is presenting for fever and weakness x 4 days associated with hypotension.    EKG: SR no ischemic changes    1) CAD s/p CABG   -denies CP   -c/w asa and simvastatin  -pt follows with Dr. Coffey  -get echo given reported Hypotension     2) Fever  - ID on board   -f/u recs  -on abx     3) DVT PPX  - recommend Heparin

## 2021-06-22 NOTE — PROVIDER CONTACT NOTE (OTHER) - ACTION/TREATMENT ORDERED:
Give 650 mg PO Tylenol as ordered. Continue to monitor and notify of any changes
NP aware, NP contacted attending to see if he wants to increase fluids. Will cont to monitor.
Provider aware, re-assess BP in 1 hour.
Provider aware NP ordered 975 of tylenol PO. Will recheck temp in 1hr
NP aware, give PRN PO tylenol, and continue to monitor.
Apply Ice packs, reassess temp in 1 hr.
IV tylenol ordered and apply cooling blanket

## 2021-06-22 NOTE — PROGRESS NOTE ADULT - ASSESSMENT
74 yo M w/ PMHx of CAD s/p stent (on ASA) and CABG, glaucoma, HTN, who is presenting for fever and weakness x 4 days associated with hypotension.     [place]   Spoke w. Pt's PCP. Baseline Scr was 1.2 in 2019   presenting w/ fevers x 6 days  SCr elevated from baseline   Possibly Pre-renal [place] in the setting of fever  vs. ATN From hypotension   SCr did not improve s/p IVF   check urine electrolytes and spot urine TP/Cr   No hydro on CT Scan   CBC w. diff in AM   UA bland   avoid nephrotoxins     Acidosis   in the setting of [place]   monitor at present     Anemia  Hb stable    HTN  BP borderline low  optimize hemodynamics

## 2021-06-22 NOTE — PROVIDER CONTACT NOTE (OTHER) - BACKGROUND
Pt admitted for Fevers lethargy
Pt admitted for Fevers
Pt admitted for Fevers,
Pt admitted for Fevers.
patient admitted for fevers, hx of CAD s/p stent and CABG, HTN, Glaucoma.
Pt admitted with fevers. Pt febrile and hypotensive earlier. Hx of CAD, HTN
Pt admitted for Fevers lethargy

## 2021-06-22 NOTE — PROVIDER CONTACT NOTE (OTHER) - REASON
Pt rectal temp 104.0 and SpO2 86% RA
pt hypotensive 90/49
Pt Rectal temp 100.5 BP 94/54
Pt rectal Temp 103.7 s/p IV tylenol.
pt has fever of 103.1
Oral temp: 103.1
Pt febrile and hypotensive

## 2021-06-22 NOTE — PROGRESS NOTE ADULT - SUBJECTIVE AND OBJECTIVE BOX
75y old  Male who presents with a chief complaint of Fever (22 Jun 2021 12:03)      Interval history:  Febrile, feeling a little better, less fatigue, no dizziness.       Allergies:   No Known Allergies      Antimicrobials:  atovaquone  Suspension 750 milliGRAM(s) Oral every 12 hours  azithromycin  IVPB 500 milliGRAM(s) IV Intermittent once  doxycycline hyclate Capsule 100 milliGRAM(s) Oral every 12 hours      REVIEW OF SYSTEMS:  No chest pain   No SOB  No abdominal pain  No dysuria  No rash.       Vital Signs Last 24 Hrs  T(C): 37.8 (06-22-21 @ 10:30), Max: 40 (06-21-21 @ 21:43)  T(F): 100 (06-22-21 @ 10:30), Max: 104 (06-21-21 @ 21:43)  HR: 79 (06-22-21 @ 10:30) (62 - 84)  BP: 96/54 (06-22-21 @ 10:30) (94/54 - 124/57)  BP(mean): --  RR: 19 (06-22-21 @ 10:30) (17 - 22)  SpO2: 92% (06-22-21 @ 10:30) (86% - 98%)      PHYSICAL EXAM:  Patient in no acute distress. AAOX3.  No icterus, no oral ulcers.  Cardiovascular: S1S2 normal.  Lungs: + air entry B/L lung fields.  Gastrointestinal: soft, nontender, nondistended.  Extremities: no edema.  IV sites not inflamed.                           11.0   6.73  )-----------( 74       ( 22 Jun 2021 06:30 )             33.9   06-22    137  |  106  |  23  ----------------------------<  126<H>  4.3   |  19<L>  |  1.71<H>    Ca    8.8      22 Jun 2021 06:30  Phos  2.5     06-21  Mg     2.2     06-21    TPro  7.0  /  Alb  2.8<L>  /  TBili  0.8  /  DBili  x   /  AST  55<H>  /  ALT  22  /  AlkPhos  66  06-22      LIVER FUNCTIONS - ( 22 Jun 2021 06:30 )  Alb: 2.8 g/dL / Pro: 7.0 g/dL / ALK PHOS: 66 U/L / ALT: 22 U/L / AST: 55 U/L / GGT: x               Babesia microti PCR, Blood. (collected 22 Jun 2021 09:07)  Source: .Blood  Final Report (22 Jun 2021 11:42):    Babesia microti PCR    Results: DETECTED    ***************Result Note*************    Parasitemia is not routinely performed.    If needed, call the microbiology laboratory.    Order "Parasitemia, Blood" for monitoring.    The detection of Babesia microti by PCR has only been    validated for whole blood; this test has not been approved    by the US Food and Drug Administration (FDA). Performance    characteristics of this assay have been determined by    [employer]. The clinical significance    of results should be considered in conjunction with the    overall clinical presentation of the patient. Result is not    intended to be used as the sole means for clinical diagnosis    or patient management decisions.    Culture - Urine (collected 20 Jun 2021 21:59)  Source: .Urine Clean Catch (Midstream)  Final Report (22 Jun 2021 01:13):    <10,000 CFU/mL Normal Urogenital Nataliia    Culture - Blood (collected 20 Jun 2021 21:03)  Source: .Blood Blood-Peripheral  Preliminary Report (21 Jun 2021 22:01):    No growth to date.    Culture - Blood (collected 20 Jun 2021 21:03)  Source: .Blood Blood  Preliminary Report (21 Jun 2021 22:01):    No growth to date.

## 2021-06-22 NOTE — PROGRESS NOTE ADULT - SUBJECTIVE AND OBJECTIVE BOX
Charles Doyle MD  Interventional Cardiology / Endovascular Specialist  [place] Office : [phone] [place] [address] [place] [address]  Tel:   [place] Office : [address] [facility] [place] [address]  Tel: [phone]  Cell : [phone]    Subjective/Overnight events: Patient lying in bed comfortably. No acute distress. Has episode of SOB overnight, resolved this morning.   	  MEDICATIONS:  aspirin enteric coated 81 milliGRAM(s) Oral daily    doxycycline hyclate Capsule 100 milliGRAM(s) Oral every 12 hours  piperacillin/tazobactam IVPB.. 3.375 Gram(s) IV Intermittent every 8 hours      acetaminophen   Tablet .. 650 milliGRAM(s) Oral every 6 hours PRN      simvastatin 40 milliGRAM(s) Oral at bedtime    latanoprost 0.005% Ophthalmic Solution 1 Drop(s) Both EYES at bedtime  sodium chloride 0.9%. 1000 milliLiter(s) IV Continuous <Continuous>      PAST MEDICAL/SURGICAL HISTORY  PAST MEDICAL & SURGICAL HISTORY:  Hypercholesteremia    Colon Polyps    Hypertension    CAD (coronary artery disease)    H/O: glaucoma    Bilateral Cataracts  Patient had macular repair 2000.    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 No nausea, vomiting, or hematemesis; No diarrhea or constipation. No melena or hematochezia.  GENITOURINARY: No dysuria, frequency, hematuria, or incontinence  NEUROLOGICAL: No headaches, memory loss, loss of strength, numbness, or tremors  ENDOCRINE: No heat or cold intolerance; No hair loss  MUSCULOSKELETAL: No joint pain or swelling; No muscle, back, or extremity pain  PSYCHIATRIC: No depression, anxiety, mood swings, or difficulty sleeping  HEME/LYMPH: No easy bruising, or bleeding gums  All others negative    PHYSICAL EXAM:  T(C): 37.8 (06-22-21 @ 10:30), Max: 40 (06-21-21 @ 21:43)  HR: 79 (06-22-21 @ 10:30) (62 - 84)  BP: 96/54 (06-22-21 @ 10:30) (94/54 - 124/57)  RR: 19 (06-22-21 @ 10:30) (17 - 22)  SpO2: 92% (06-22-21 @ 10:30) (86% - 98%)  Wt(kg): --  I&O's Summary    21 Jun 2021 07:01  -  22 Jun 2021 07:00  --------------------------------------------------------  IN: 240 mL / OUT: 0 mL / NET: 240 mL          GENERAL: NAD  EYES: EOMI, PERRLA, conjunctiva and sclera clear  ENMT: No tonsillar erythema, exudates, or enlargement  Cardiovascular: Normal S1 S2, No JVD, No murmurs, No edema  Respiratory: Lungs b/l rhonchi to auscultation	  Gastrointestinal:  Soft, Non-tender, + BS	  Extremities: No edema                                  11.0   6.73  )-----------( 74       ( 22 Jun 2021 06:30 )             33.9     06-22    137  |  106  |  23  ----------------------------<  126<H>  4.3   |  19<L>  |  1.71<H>    Ca    8.8      22 Jun 2021 06:30  Phos  2.5     06-21  Mg     2.2     06-21    TPro  7.0  /  Alb  2.8<L>  /  TBili  0.8  /  DBili  x   /  AST  55<H>  /  ALT  22  /  AlkPhos  66  06-22    proBNP:   Lipid Profile:   HgA1c:   TSH:     Consultant(s) Notes Reviewed:  [x ] YES  [ ] NO    Care Discussed with Consultants/Other Providers [ x] YES  [ ] NO    Imaging Personally Reviewed independently:  [x] YES  [ ] NO    All labs, radiologic studies, vitals, orders and medications list reviewed. Patient is seen and examined at bedside. Case discussed with medical team.

## 2021-06-23 LAB
ALBUMIN SERPL ELPH-MCNC: 2.8 G/DL — LOW (ref 3.3–5)
ALP SERPL-CCNC: 62 U/L — SIGNIFICANT CHANGE UP (ref 40–120)
ALT FLD-CCNC: 27 U/L — SIGNIFICANT CHANGE UP (ref 10–45)
ANION GAP SERPL CALC-SCNC: 12 MMOL/L — SIGNIFICANT CHANGE UP (ref 5–17)
AST SERPL-CCNC: 61 U/L — HIGH (ref 10–40)
BASOPHILS # BLD AUTO: 0.1 K/UL — SIGNIFICANT CHANGE UP (ref 0–0.2)
BASOPHILS NFR BLD AUTO: 1.8 % — SIGNIFICANT CHANGE UP (ref 0–2)
BILIRUB SERPL-MCNC: 0.8 MG/DL — SIGNIFICANT CHANGE UP (ref 0.2–1.2)
BUN SERPL-MCNC: 20 MG/DL — SIGNIFICANT CHANGE UP (ref 7–23)
CALCIUM SERPL-MCNC: 8.3 MG/DL — LOW (ref 8.4–10.5)
CHLORIDE SERPL-SCNC: 107 MMOL/L — SIGNIFICANT CHANGE UP (ref 96–108)
CO2 SERPL-SCNC: 19 MMOL/L — LOW (ref 22–31)
CREAT SERPL-MCNC: 1.57 MG/DL — HIGH (ref 0.5–1.3)
CULTURE RESULTS: SIGNIFICANT CHANGE UP
EOSINOPHIL # BLD AUTO: 0 K/UL — SIGNIFICANT CHANGE UP (ref 0–0.5)
EOSINOPHIL NFR BLD AUTO: 0 % — SIGNIFICANT CHANGE UP (ref 0–6)
GLUCOSE SERPL-MCNC: 125 MG/DL — HIGH (ref 70–99)
HCT VFR BLD CALC: 32.7 % — LOW (ref 39–50)
HGB BLD-MCNC: 10.7 G/DL — LOW (ref 13–17)
LYMPHOCYTES # BLD AUTO: 0.78 K/UL — LOW (ref 1–3.3)
LYMPHOCYTES # BLD AUTO: 14 % — SIGNIFICANT CHANGE UP (ref 13–44)
MANUAL SMEAR VERIFICATION: SIGNIFICANT CHANGE UP
MCHC RBC-ENTMCNC: 31.4 PG — SIGNIFICANT CHANGE UP (ref 27–34)
MCHC RBC-ENTMCNC: 32.7 GM/DL — SIGNIFICANT CHANGE UP (ref 32–36)
MCV RBC AUTO: 95.9 FL — SIGNIFICANT CHANGE UP (ref 80–100)
MONOCYTES # BLD AUTO: 0.53 K/UL — SIGNIFICANT CHANGE UP (ref 0–0.9)
MONOCYTES NFR BLD AUTO: 9.6 % — SIGNIFICANT CHANGE UP (ref 2–14)
NEUTROPHILS # BLD AUTO: 4.15 K/UL — SIGNIFICANT CHANGE UP (ref 1.8–7.4)
NEUTROPHILS NFR BLD AUTO: 73.7 % — SIGNIFICANT CHANGE UP (ref 43–77)
NEUTS BAND # BLD: 0.9 % — SIGNIFICANT CHANGE UP (ref 0–8)
PLAT MORPH BLD: NORMAL — SIGNIFICANT CHANGE UP
PLATELET # BLD AUTO: 71 K/UL — LOW (ref 150–400)
POTASSIUM SERPL-MCNC: 4.1 MMOL/L — SIGNIFICANT CHANGE UP (ref 3.5–5.3)
POTASSIUM SERPL-SCNC: 4.1 MMOL/L — SIGNIFICANT CHANGE UP (ref 3.5–5.3)
PROT SERPL-MCNC: 7 G/DL — SIGNIFICANT CHANGE UP (ref 6–8.3)
RBC # BLD: 3.41 M/UL — LOW (ref 4.2–5.8)
RBC # FLD: 12.3 % — SIGNIFICANT CHANGE UP (ref 10.3–14.5)
RBC BLD AUTO: NORMAL — SIGNIFICANT CHANGE UP
SODIUM SERPL-SCNC: 138 MMOL/L — SIGNIFICANT CHANGE UP (ref 135–145)
SPECIMEN SOURCE: SIGNIFICANT CHANGE UP
WBC # BLD: 5.56 K/UL — SIGNIFICANT CHANGE UP (ref 3.8–10.5)
WBC # FLD AUTO: 5.56 K/UL — SIGNIFICANT CHANGE UP (ref 3.8–10.5)

## 2021-06-23 PROCEDURE — 93306 TTE W/DOPPLER COMPLETE: CPT | Mod: 26

## 2021-06-23 PROCEDURE — 99232 SBSQ HOSP IP/OBS MODERATE 35: CPT

## 2021-06-23 PROCEDURE — 71045 X-RAY EXAM CHEST 1 VIEW: CPT | Mod: 26

## 2021-06-23 RX ORDER — SODIUM CHLORIDE 9 MG/ML
1000 INJECTION INTRAMUSCULAR; INTRAVENOUS; SUBCUTANEOUS
Refills: 0 | Status: DISCONTINUED | OUTPATIENT
Start: 2021-06-23 | End: 2021-06-23

## 2021-06-23 RX ORDER — ACETAMINOPHEN 500 MG
1000 TABLET ORAL ONCE
Refills: 0 | Status: COMPLETED | OUTPATIENT
Start: 2021-06-23 | End: 2021-06-23

## 2021-06-23 RX ADMIN — Medication 650 MILLIGRAM(S): at 16:02

## 2021-06-23 RX ADMIN — Medication 1000 MILLIGRAM(S): at 01:01

## 2021-06-23 RX ADMIN — AZITHROMYCIN 250 MILLIGRAM(S): 500 TABLET, FILM COATED ORAL at 11:55

## 2021-06-23 RX ADMIN — Medication 100 MILLIGRAM(S): at 05:24

## 2021-06-23 RX ADMIN — Medication 650 MILLIGRAM(S): at 16:55

## 2021-06-23 RX ADMIN — ATOVAQUONE 750 MILLIGRAM(S): 750 SUSPENSION ORAL at 16:02

## 2021-06-23 RX ADMIN — Medication 400 MILLIGRAM(S): at 00:25

## 2021-06-23 RX ADMIN — Medication 81 MILLIGRAM(S): at 11:55

## 2021-06-23 RX ADMIN — SODIUM CHLORIDE 75 MILLILITER(S): 9 INJECTION INTRAMUSCULAR; INTRAVENOUS; SUBCUTANEOUS at 11:55

## 2021-06-23 RX ADMIN — Medication 100 MILLIGRAM(S): at 17:23

## 2021-06-23 RX ADMIN — LATANOPROST 1 DROP(S): 0.05 SOLUTION/ DROPS OPHTHALMIC; TOPICAL at 21:49

## 2021-06-23 RX ADMIN — SIMVASTATIN 40 MILLIGRAM(S): 20 TABLET, FILM COATED ORAL at 21:49

## 2021-06-23 RX ADMIN — ATOVAQUONE 750 MILLIGRAM(S): 750 SUSPENSION ORAL at 05:24

## 2021-06-23 NOTE — PROVIDER CONTACT NOTE (CRITICAL VALUE NOTIFICATION) - BACKGROUND
Pt p/w fevers & lethergy. Pt on zoysn, has been spiking fevers everyday. ID following.
PMH CAD, HTN, glaucoma. Current admission for fever, weakness, and hypotension.

## 2021-06-23 NOTE — PROVIDER CONTACT NOTE (CHANGE IN STATUS NOTIFICATION) - ASSESSMENT
No s/s of distress. All other vitals stable. Maintained on 1L NC, some SOB after ambulating to the bathroom earlier.

## 2021-06-23 NOTE — PROGRESS NOTE ADULT - PROBLEM SELECTOR PLAN 3
Continue asa/statin  holding atenolol  hx of CABG, monitor hemodynamics  Card follow up P  Check Echo
Continue asa/statin  holding atenolol  hx of CABG, monitor hemodynamics  Card follow up P  Check Echo
Continue asa/statin  holding atenolol  hx of CABG, monito rhemodynamics  Card follo wup P

## 2021-06-23 NOTE — CHART NOTE - NSCHARTNOTEFT_GEN_A_CORE
MEDICINE NP    FRANCESCO BAUMAN  75y Male    Patient is a 75y old  Male who presents with a chief complaint of Fever (22 Jun 2021 19:42)       > Event Summary:  Notified by RN, Patient with         -Vital Signs Last 24 Hrs  T(C): 38.9 (23 Jun 2021 01:05), Max: 39.5 (22 Jun 2021 15:52)  T(F): 102 (23 Jun 2021 01:05), Max: 103.1 (22 Jun 2021 15:52)  HR: 85 (23 Jun 2021 00:11) (62 - 87)  BP: 115/70 (23 Jun 2021 00:11) (90/49 - 115/70)  RR: 18 (23 Jun 2021 00:11) (18 - 19)  SpO2: 92% (23 Jun 2021 00:11) (89% - 98%)    > Physical Assessment:  General:  A&Ox3, nonfocal  CV: +S1S2, RRR.  No peripheral edema  Respiratory: Even, unlabored.  CTA B/L.    Abdomen:  +BS.  Soft, NT, ND.  No palpable mass  MSK: ESQUEDA x4.   Skin: +Feverish to touch          > Assessment & Plan:  - HPI:     -Plan:               EDDIE Reyez-BC  Medicine Department MEDICINE NP    FRANCESCO BAUMAN  75y Male    Patient is a 75y old  Male who presents with a chief complaint of Fever (22 Jun 2021 19:42)       > Event Summary:  Notified by RN, Patient with recurrent fever, T-103.1, some SOB post ambulation, and also with Lyme titer results positive.  Patient seen and examined at bedside, AAOX3, report recurrent dyspnea after ambulation tonight.  Denies feverish, chills, palpitations, chest pain.    -Vital Signs Last 24 Hrs  T(C): 38.9 (23 Jun 2021 01:05), Max: 39.5 (22 Jun 2021 15:52)  T(F): 102 (23 Jun 2021 01:05), Max: 103.1 (22 Jun 2021 15:52)  HR: 85 (23 Jun 2021 00:11) (62 - 87)  BP: 115/70 (23 Jun 2021 00:11) (90/49 - 115/70)  RR: 18 (23 Jun 2021 00:11) (18 - 19)  SpO2: 92% (23 Jun 2021 00:11) (89% - 98%)    > Physical Assessment:  General:  A&Ox3, nonfocal  CV: +S1S2, RRR.  No peripheral edema  Respiratory: Even, unlabored.  CTA B/L.    Abdomen:  +BS.  Soft, NT, ND.  No palpable mass  MSK: ESQUEDA x4.   Skin: +Feverish to touch      LABS:  -Lyme C6 Interpretation: Positive:  (06.22.21 @ 08:57)    -x2 Culture - Blood (06.20.21 @ 21:03) No growth to date.           > Assessment & Plan:  - HPI: 74 yo M w/ PMHx of CAD s/p stent (on ASA) and CABG, glaucoma, HTN, who is presenting for fever and weakness x 4 days associated with hypotension. Patient reports that he has had fevers and chills x 4 days. Has felt generally weak, lethargic. Laid in bed a lot. No focal signs localizing infection.  Admitted with FUO with +Babeisa PCR on Azithromycin and Mepron, and Vibramycin iv ppx for Lyme.  Now with recurrent fever.    1. Fever - Multifactorial  -Tylenol iv for fevers and cooling measures prn  -f/u rpt BCX x2 ordered  -Lyme Titer confirmed positive -c/w Vibramycin iv and f/u with ID in AM   -C/w Azithromycin and Mepron as above    2. Lyme C6 Interpretation: Positive:  (06.22.21 @ 08:57)  -c/w Vibramycin iv and f/u with ID in AM     3. Dyspnea  >CT Chest (06.21.21 @ 05:17) : Several tree-in-bud nodular opacities in the ORIN,  pulmonary nodule RUL, and trace left pleural effusion.  -Patient with mild hypoxia yesterday, c/w O2-2LNC and titrate as indicated  -Consider re-evaluate ivf  -f/u ECHO  -Can consider Pulm c/w if indicated       EDDIE Reyez-BC  Medicine Department  #[id number]

## 2021-06-23 NOTE — PROGRESS NOTE ADULT - PROBLEM SELECTOR PLAN 1
Unclear cause  f/u cultures  Pan CT   ID eval called, follow up recs   travelled to [facility], tick W/U, babesia noted   Cont doxy   on zosyn for broad spectrum coverage
Unclear cause  f/u cultures  Pan CT   ID eval called, follow up recs   travelled to [facility], lyme W/U  started on doxy   ami zosyn for broad spectrum coverage
Unclear cause  f/u cultures  Pan CT   ID eval called, follow up recs   travelled to [facility], tick W/U  Cont doxy   on zosyn for broad spectrum coverage  splenomegaly and thrombocytopenia   obtain hematology eval house in AM for bone marrow biopsy eval

## 2021-06-23 NOTE — PROGRESS NOTE ADULT - SUBJECTIVE AND OBJECTIVE BOX
Charles Doyle MD  Interventional Cardiology / Endovascular Specialist  [place] Office : [address] [place] [address] [place] [address]  Tel:   [place] Office : [address] [facility] [place] [address]  Tel: [phone]  Cell : [phone]    Subjective/Overnight events: Patient sitting up in bed. denies chest pain or palpitations. Has some SOB on NC  	  MEDICATIONS:  aspirin enteric coated 81 milliGRAM(s) Oral daily    atovaquone  Suspension 750 milliGRAM(s) Oral every 12 hours  azithromycin   Tablet 250 milliGRAM(s) Oral daily  doxycycline hyclate Capsule 100 milliGRAM(s) Oral every 12 hours      acetaminophen   Tablet .. 650 milliGRAM(s) Oral every 6 hours PRN      simvastatin 40 milliGRAM(s) Oral at bedtime    latanoprost 0.005% Ophthalmic Solution 1 Drop(s) Both EYES at bedtime  sodium chloride 0.9%. 1000 milliLiter(s) IV Continuous <Continuous>      PAST MEDICAL/SURGICAL HISTORY  PAST MEDICAL & SURGICAL HISTORY:  Hypercholesteremia    Colon Polyps    Hypertension    CAD (coronary artery disease)    H/O: glaucoma    Bilateral Cataracts  Patient had macular repair 2000.    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 No nausea, vomiting, or hematemesis; No diarrhea or constipation. No melena or hematochezia.  GENITOURINARY: No dysuria, frequency, hematuria, or incontinence  NEUROLOGICAL: No headaches, memory loss, loss of strength, numbness, or tremors  ENDOCRINE: No heat or cold intolerance; No hair loss  MUSCULOSKELETAL: No joint pain or swelling; No muscle, back, or extremity pain  PSYCHIATRIC: No depression, anxiety, mood swings, or difficulty sleeping  HEME/LYMPH: No easy bruising, or bleeding gums  All others negative    PHYSICAL EXAM:  T(C): 37 (06-23-21 @ 10:16), Max: 39.5 (06-22-21 @ 15:52)  HR: 109 (06-23-21 @ 10:16) (62 - 109)  BP: 146/69 (06-23-21 @ 10:16) (90/49 - 146/69)  RR: 19 (06-23-21 @ 10:16) (18 - 19)  SpO2: 97% (06-23-21 @ 10:16) (89% - 98%)  Wt(kg): --  I&O's Summary    22 Jun 2021 07:01  -  23 Jun 2021 07:00  --------------------------------------------------------  IN: 120 mL / OUT: 200 mL / NET: -80 mL          GENERAL: NAD  EYES: EOMI, PERRLA, conjunctiva and sclera clear  ENMT: No tonsillar erythema, exudates, or enlargement  Cardiovascular: Normal S1 S2, No JVD, No murmurs, No edema  Respiratory: Lungs b/l rhonchi to auscultation	  Gastrointestinal:  Soft, Non-tender, + BS	  Extremities: No edema                                    10.7   5.56  )-----------( 71       ( 23 Jun 2021 06:34 )             32.7     06-23    138  |  107  |  20  ----------------------------<  125<H>  4.1   |  19<L>  |  1.57<H>    Ca    8.3<L>      23 Jun 2021 06:34    TPro  7.0  /  Alb  2.8<L>  /  TBili  0.8  /  DBili  x   /  AST  61<H>  /  ALT  27  /  AlkPhos  62  06-23    proBNP:   Lipid Profile:   HgA1c:   TSH:     Consultant(s) Notes Reviewed:  [x ] YES  [ ] NO    Care Discussed with Consultants/Other Providers [ x] YES  [ ] NO    Imaging Personally Reviewed independently:  [x] YES  [ ] NO    All labs, radiologic studies, vitals, orders and medications list reviewed. Patient is seen and examined at bedside. Case discussed with medical team.

## 2021-06-23 NOTE — PROGRESS NOTE ADULT - SUBJECTIVE AND OBJECTIVE BOX
[facility] [place] [facility]   MD Rob Hernandez MD, D.O. Ruoru Wong, PA    From 7 AM - 5 PM:  OFFICE: [phone]  Dr. Ortega cell: [phone]  Dr. Arnold cell: [phone]  Dr. Guerrero cell: [phone]  KARELY Fabian cell: [phone]    From 5 PM - 7 AM: Answering Service: [phone]  Date of service: 06-23-21 @ 09:55    RENAL FOLLOW UP NOTE  --------------------------------------------------------------------------------  HPI:  Pt seen and examined at bedside.   Granties SOB, chest pain     PAST HISTORY  --------------------------------------------------------------------------------  No significant changes to PMH, PSH, FHx, SHx, unless otherwise noted    ALLERGIES & MEDICATIONS  --------------------------------------------------------------------------------  Allergies    No Known Allergies    Intolerances      Standing Inpatient Medications  aspirin enteric coated 81 milliGRAM(s) Oral daily  atovaquone  Suspension 750 milliGRAM(s) Oral every 12 hours  azithromycin   Tablet 250 milliGRAM(s) Oral daily  doxycycline hyclate Capsule 100 milliGRAM(s) Oral every 12 hours  latanoprost 0.005% Ophthalmic Solution 1 Drop(s) Both EYES at bedtime  simvastatin 40 milliGRAM(s) Oral at bedtime    PRN Inpatient Medications  acetaminophen   Tablet .. 650 milliGRAM(s) Oral every 6 hours PRN      REVIEW OF SYSTEMS  --------------------------------------------------------------------------------  General: no fever  CVS: no chest pain  RESP: no sob, no cough  ABD: no abdominal pain  : no dysuria  MSK: no edema     VITALS/PHYSICAL EXAM  --------------------------------------------------------------------------------  T(C): 36.4 (06-23-21 @ 04:38), Max: 39.5 (06-22-21 @ 15:52)  HR: 62 (06-23-21 @ 04:38) (62 - 87)  BP: 103/60 (06-23-21 @ 04:38) (90/49 - 115/70)  RR: 18 (06-23-21 @ 04:38) (18 - 19)  SpO2: 97% (06-23-21 @ 04:38) (89% - 98%)  Wt(kg): --    06-22-21 @ 07:01  -  06-23-21 @ 07:00  --------------------------------------------------------  IN: 120 mL / OUT: 200 mL / NET: -80 mL      Physical Exam:  	Gen: NAD  	HEENT: MMM  	Pulm: CTA B/L  	CV: S1S2  	Abd: Soft, +BS  	Ext: No LE edema B/L                      Neuro: Awake   	Skin: Warm and Dry       LABS/STUDIES  --------------------------------------------------------------------------------              10.7   5.56  >-----------<  71       [06-23-21 @ 06:34]              32.7     138  |  107  |  20  ----------------------------<  125      [06-23-21 @ 06:34]  4.1   |  19  |  1.57        Ca     8.3     [06-23-21 @ 06:34]      Mg     2.2     [06-21-21 @ 10:22]      Phos  2.5     [06-21-21 @ 10:22]    TPro  7.0  /  Alb  2.8  /  TBili  0.8  /  DBili  x   /  AST  61  /  ALT  27  /  AlkPhos  62  [06-23-21 @ 06:34]          [06-22-21 @ 06:30]    Creatinine Trend:  SCr 1.57 [06-23 @ 06:34]  SCr 1.71 [06-22 @ 06:30]  SCr 1.71 [06-21 @ 10:22]  SCr 1.56 [06-21 @ 06:11]  SCr 1.59 [06-20 @ 16:50]    Urinalysis - [06-20-21 @ 18:31]      Color Yellow / Appearance Clear / SG 1.011 / pH 6.0      Gluc Negative / Ketone Negative  / Bili Negative / Urobili Negative       Blood Negative / Protein Trace / Leuk Est Negative / Nitrite Negative      RBC 2 / WBC 0 / Hyaline  / Gran  / Sq Epi  / Non Sq Epi 0 / Bacteria Negative    Urine Creatinine 98      [06-22-21 @ 22:15]  Urine Protein 63      [06-22-21 @ 22:15]  Urine Sodium 49      [06-22-21 @ 22:15]  Urine Chloride 45      [06-22-21 @ 22:15]  Urine Osmolality 491      [06-22-21 @ 22:15]    Iron 26, TIBC 249, %sat 10      [06-21-21 @ 15:36]  Ferritin 1446      [06-22-21 @ 14:05]    HCV 0.37, Nonreact      [06-21-21 @ 08:44]

## 2021-06-23 NOTE — PROGRESS NOTE ADULT - SUBJECTIVE AND OBJECTIVE BOX
Name of Patient : FRANCESCO BAUMAN  MRN: [id number]  DATE OF SERVICE: 06-23-21     Subjective: Patient seen and examined. No new events except as noted.   febrile again  babesia     REVIEW OF SYSTEMS:    CONSTITUTIONAL: No weakness, fevers or chills  EYES/ENT: No visual changes;  No vertigo or throat pain   NECK: No pain or stiffness  RESPIRATORY: No cough, wheezing, hemoptysis; No shortness of breath  CARDIOVASCULAR: No chest pain or palpitations  GASTROINTESTINAL: No abdominal or epigastric pain.   GENITOURINARY: No dysuria, frequency or hematuria  NEUROLOGICAL: No numbness or weakness  SKIN: No itching, burning, rashes, or lesions   All other review of systems is negative unless indicated above.    MEDICATIONS:  MEDICATIONS  (STANDING):  aspirin enteric coated 81 milliGRAM(s) Oral daily  atovaquone  Suspension 750 milliGRAM(s) Oral every 12 hours  azithromycin   Tablet 250 milliGRAM(s) Oral daily  doxycycline hyclate Capsule 100 milliGRAM(s) Oral every 12 hours  latanoprost 0.005% Ophthalmic Solution 1 Drop(s) Both EYES at bedtime  simvastatin 40 milliGRAM(s) Oral at bedtime      PHYSICAL EXAM:  T(C): 37.3 (06-23-21 @ 17:00), Max: 39.5 (06-23-21 @ 00:11)  HR: 86 (06-23-21 @ 15:58) (62 - 109)  BP: 126/66 (06-23-21 @ 15:58) (103/60 - 146/69)  RR: 18 (06-23-21 @ 15:58) (18 - 19)  SpO2: 96% (06-23-21 @ 15:58) (92% - 98%)  Wt(kg): --  I&O's Summary    22 Jun 2021 07:01  -  23 Jun 2021 07:00  --------------------------------------------------------  IN: 120 mL / OUT: 200 mL / NET: -80 mL    23 Jun 2021 07:01  -  23 Jun 2021 19:20  --------------------------------------------------------  IN: 700 mL / OUT: 300 mL / NET: 400 mL          Appearance: Normal	  HEENT:  PERRLA   Lymphatic: No lymphadenopathy   Cardiovascular: Normal S1 S2, no JVD  Respiratory: normal effort , clear  Gastrointestinal:  Soft, Non-tender  Skin: No rashes,  warm to touch  Psychiatry:  Mood & affect appropriate  Musculuskeletal: No edema      All labs, Imaging and EKGs personally reviewed       Parasitemia, Blood (collected 06-22-21 @ 20:41)  Source: .Blood  Final Report (06-23-21 @ 09:59):    Babesia species by Giemsa Stain    Parasitemia = 1.2 %    06-22-21 @ 07:01  -  06-23-21 @ 07:00  --------------------------------------------------------  IN: 120 mL / OUT: 200 mL / NET: -80 mL    06-23-21 @ 07:01  -  06-23-21 @ 19:20  --------------------------------------------------------  IN: 700 mL / OUT: 300 mL / NET: 400 mL                          10.7   5.56  )-----------( 71       ( 23 Jun 2021 06:34 )             32.7               06-23    138  |  107  |  20  ----------------------------<  125<H>  4.1   |  19<L>  |  1.57<H>    Ca    8.3<L>      23 Jun 2021 06:34    TPro  7.0  /  Alb  2.8<L>  /  TBili  0.8  /  DBili  x   /  AST  61<H>  /  ALT  27  /  AlkPhos  62  06-23

## 2021-06-23 NOTE — PROGRESS NOTE ADULT - SUBJECTIVE AND OBJECTIVE BOX
Patient is a 75y old  Male who presents with a chief complaint of Fever (23 Jun 2021 10:26)    Being followed by ID for        Interval history:  No other acute events      ROS:  No cough,SOB,CP  No N/V/D  No abd pain  No urinary complaints  No HA  No joint or limb pain  No other complaints    PAST MEDICAL & SURGICAL HISTORY:  Hypercholesteremia    Colon Polyps    Hypertension    CAD (coronary artery disease)    H/O: glaucoma    Bilateral Cataracts  Patient had macular repair 2000.    S/P CABG X 4  6/2009    S/P Coronary Angiogram  with stent - 2001      Allergies    No Known Allergies    Intolerances      Antimicrobials:    atovaquone  Suspension 750 milliGRAM(s) Oral every 12 hours  azithromycin   Tablet 250 milliGRAM(s) Oral daily  doxycycline hyclate Capsule 100 milliGRAM(s) Oral every 12 hours    MEDICATIONS  (STANDING):  aspirin enteric coated 81 milliGRAM(s) Oral daily  atovaquone  Suspension 750 milliGRAM(s) Oral every 12 hours  azithromycin   Tablet 250 milliGRAM(s) Oral daily  doxycycline hyclate Capsule 100 milliGRAM(s) Oral every 12 hours  latanoprost 0.005% Ophthalmic Solution 1 Drop(s) Both EYES at bedtime  simvastatin 40 milliGRAM(s) Oral at bedtime      Vital Signs Last 24 Hrs  T(C): 37.9 (06-23-21 @ 15:58), Max: 39.5 (06-23-21 @ 00:11)  T(F): 100.2 (06-23-21 @ 15:58), Max: 103.1 (06-23-21 @ 00:11)  HR: 86 (06-23-21 @ 15:58) (62 - 109)  BP: 126/66 (06-23-21 @ 15:58) (90/49 - 146/69)  BP(mean): --  RR: 18 (06-23-21 @ 15:58) (18 - 19)  SpO2: 96% (06-23-21 @ 15:58) (92% - 98%)    Physical Exam:    Constitutional well preserved,comfortable,pleasant    HEENT PERRLA EOMI,No pallor or icterus    No oral exudate or erythema    Neck supple no JVD or LN    Chest Good AE,CTA    CVS RRR S1 S2 WNl No murmur or rub or gallop    Abd soft BS normal No tenderness no masses    Ext No cyanosis clubbing or edema    IV site no erythema tenderness or discharge    Joints no swelling or LOM    CNS AAO X 3 no focal    Lab Data:                          10.7   5.56  )-----------( 71       ( 23 Jun 2021 06:34 )             32.7       06-23    138  |  107  |  20  ----------------------------<  125<H>  4.1   |  19<L>  |  1.57<H>    Ca    8.3<L>      23 Jun 2021 06:34    TPro  7.0  /  Alb  2.8<L>  /  TBili  0.8  /  DBili  x   /  AST  61<H>  /  ALT  27  /  AlkPhos  62  06-23          .Blood  06-22-21   Babesia species by Giemsa Stain  Parasitemia = 1.2 %  --  --      .Blood  06-22-21   Babesia microti PCR  Results: DETECTED  ***************Result Note*************  Parasitemia is not routinely performed.  If needed, call the microbiology laboratory.  Order "Parasitemia, Blood" for monitoring.  The detection of Babesia microti by PCR has only been  validated for whole blood; this test has not been approved  by the US Food and Drug Administration (FDA). Performance  characteristics of this assay have been determined by  [employer]. The clinical significance  of results should be considered in conjunction with the  overall clinical presentation of the patient. Result is not  intended to be used as the sole means for clinical diagnosis  or patient management decisions.  --  --      .Urine Clean Catch (Midstream)  06-20-21   <10,000 CFU/mL Normal Urogenital Nataliia  --  --      .Blood Blood  06-20-21   No growth to date.  --  --        Borrelia burgdorferi IgG/IgM Antibodies (06.22.21 @ 08:57)    LYME IgG/IgM Antibodies Result: 1.83 Index    Lyme C6 Interpretation: Positive: METHOD: Liasion Chemiluminescent Immunoassay      Reference Range: (values expressed as Lyme Index )                                < 0.90        Negative                                0.90 - 1.09   Equivocal                                >= 1.10     Positive  CDC/ASTPHLD Guidelines recommend that all samples judged equivocal or  positive be re-tested by confirmatory immunoassays.                WBC Count: 5.56 (06-23-21 @ 06:34)  WBC Count: 6.73 (06-22-21 @ 06:30)  WBC Count: 6.37 (06-21-21 @ 10:22)  WBC Count: 5.09 (06-21-21 @ 06:11)  WBC Count: 6.22 (06-20-21 @ 16:50)             Patient is a 75y old  Male who presents with a chief complaint of Fever (23 Jun 2021 10:26)    Being followed by ID for        Interval history:  pt feeling improved today  still with some fever  had two loose bowel movements   No other acute events      PAST MEDICAL & SURGICAL HISTORY:  Hypercholesteremia    Colon Polyps    Hypertension    CAD (coronary artery disease)    H/O: glaucoma    Bilateral Cataracts  Patient had macular repair 2000.    S/P CABG X 4  6/2009    S/P Coronary Angiogram  with stent - 2001      Allergies    No Known Allergies    Intolerances      Antimicrobials:    atovaquone  Suspension 750 milliGRAM(s) Oral every 12 hours  azithromycin   Tablet 250 milliGRAM(s) Oral daily  doxycycline hyclate Capsule 100 milliGRAM(s) Oral every 12 hours    MEDICATIONS  (STANDING):  aspirin enteric coated 81 milliGRAM(s) Oral daily  atovaquone  Suspension 750 milliGRAM(s) Oral every 12 hours  azithromycin   Tablet 250 milliGRAM(s) Oral daily  doxycycline hyclate Capsule 100 milliGRAM(s) Oral every 12 hours  latanoprost 0.005% Ophthalmic Solution 1 Drop(s) Both EYES at bedtime  simvastatin 40 milliGRAM(s) Oral at bedtime      Vital Signs Last 24 Hrs  T(C): 37.9 (06-23-21 @ 15:58), Max: 39.5 (06-23-21 @ 00:11)  T(F): 100.2 (06-23-21 @ 15:58), Max: 103.1 (06-23-21 @ 00:11)  HR: 86 (06-23-21 @ 15:58) (62 - 109)  BP: 126/66 (06-23-21 @ 15:58) (90/49 - 146/69)  BP(mean): --  RR: 18 (06-23-21 @ 15:58) (18 - 19)  SpO2: 96% (06-23-21 @ 15:58) (92% - 98%)    Physical Exam:    Constitutional well preserved,comfortable,pleasant    HEENT PERRLA EOMI,No pallor or icterus    No oral exudate or erythema    Neck supple no JVD or LN    Chest Good AE,CTA    CVS RRR S1 S2 WNl     Abd soft BS normal No tenderness     Ext No cyanosis clubbing or edema    IV site no erythema tenderness or discharge    Joints no swelling or LOM    CNS AAO X 3 no focal    Lab Data:                          10.7   5.56  )-----------( 71       ( 23 Jun 2021 06:34 )             32.7       06-23    138  |  107  |  20  ----------------------------<  125<H>  4.1   |  19<L>  |  1.57<H>    Ca    8.3<L>      23 Jun 2021 06:34    TPro  7.0  /  Alb  2.8<L>  /  TBili  0.8  /  DBili  x   /  AST  61<H>  /  ALT  27  /  AlkPhos  62  06-23          .Blood  06-22-21   Babesia species by Giemsa Stain  Parasitemia = 1.2 %  --  --      .Blood  06-22-21   Babesia microti PCR  Results: DETECTED  ***************Result Note*************  Parasitemia is not routinely performed.  If needed, call the microbiology laboratory.  Order "Parasitemia, Blood" for monitoring.  The detection of Babesia microti by PCR has only been  validated for whole blood; this test has not been approved  by the US Food and Drug Administration (FDA). Performance  characteristics of this assay have been determined by  [employer]. The clinical significance  of results should be considered in conjunction with the  overall clinical presentation of the patient. Result is not  intended to be used as the sole means for clinical diagnosis  or patient management decisions.  --  --      .Urine Clean Catch (Midstream)  06-20-21   <10,000 CFU/mL Normal Urogenital Nataliia  --  --      .Blood Blood  06-20-21   No growth to date.  --  --        Borrelia burgdorferi IgG/IgM Antibodies (06.22.21 @ 08:57)    LYME IgG/IgM Antibodies Result: 1.83 Index    Lyme C6 Interpretation: Positive: METHOD: Liasion Chemiluminescent Immunoassay      Reference Range: (values expressed as Lyme Index )                                < 0.90        Negative                                0.90 - 1.09   Equivocal                                >= 1.10     Positive  CDC/ASTPHLD Guidelines recommend that all samples judged equivocal or  positive be re-tested by confirmatory immunoassays.                WBC Count: 5.56 (06-23-21 @ 06:34)  WBC Count: 6.73 (06-22-21 @ 06:30)  WBC Count: 6.37 (06-21-21 @ 10:22)  WBC Count: 5.09 (06-21-21 @ 06:11)  WBC Count: 6.22 (06-20-21 @ 16:50)

## 2021-06-23 NOTE — PROVIDER CONTACT NOTE (CRITICAL VALUE NOTIFICATION) - ASSESSMENT
Pt. A&O 4. No s/s of distress noted. Febrile. All other vitals stable.
Pt A&OX4, vss, pt denies dizziness, sob, cp.

## 2021-06-23 NOTE — PROGRESS NOTE ADULT - ASSESSMENT
76 yo M w/ PMHx of CAD s/p stent (on ASA) and CABG, glaucoma, HTN, who is presenting for fever and weakness x 4 days associated with hypotension.     Overall fever, relative hypotension, thrombocytopenia, ? anemia vs baseline.   mild transaminitis  given recent visit to [facility], thrombocytopenia need to r/o tick borne infection.   Babesia PCR +     Plan:   stopped zosyn   c/w doxy in view of the lyme serology   anaplasma/ehrlichia serologies pending-  ,   started Azithromycin and mepron for babesia.   follow parasitemia  tick panel pending.   babesia PCR +  follow up blood cx, NTD   follow up urine cx, NTD   EBV/CMV serologies pending   trend CBC for thrombocytopenia.   trend Cr  LDH elevated   check C diff if diarrhea continues    Pt notes significant improvement despite fever last night      Micheline Arredondo M.D. ,   Pager [phone]     after 5PM/ weekends [phone]    Assessment and plan discussed with the primary team .

## 2021-06-23 NOTE — PROGRESS NOTE ADULT - ASSESSMENT
Assessment and Plan    74 yo M w/ PMHx of CAD s/p stent (on ASA) and CABG, glaucoma, HTN, who is presenting for fever and weakness x 4 days associated with hypotension.    EKG: SR no ischemic changes    1) CAD s/p CABG   -denies CP   -c/w asa and simvastatin  -pt follows with Dr. Coffey  -get echo given reported Hypotension     2) Fever  - ID on board   -f/u recs  -on abx   -babesia PCR +    3) DVT PPX  - recommend Heparin  Assessment and Plan    74 yo M w/ PMHx of CAD s/p stent (on ASA) and CABG, glaucoma, HTN, who is presenting for fever and weakness x 4 days associated with hypotension.    EKG: SR no ischemic changes    1) CAD s/p CABG   -denies CP   -c/w asa and simvastatin  -pt follows with Dr. Coffey  - echo normal LV function     2) Fever  - ID on board   -f/u recs  -on abx   -babesia PCR +    3) DVT PPX  - recommend Heparin

## 2021-06-23 NOTE — PROVIDER CONTACT NOTE (CRITICAL VALUE NOTIFICATION) - ACTION/TREATMENT ORDERED:
NP made aware. Will cont to monitor.
NP saw pt. at bedside and notified him of results. No action at this time. Will continue to monitor pt.

## 2021-06-23 NOTE — PROGRESS NOTE ADULT - ASSESSMENT
74 yo M w/ PMHx of CAD s/p stent (on ASA) and CABG, glaucoma, HTN, who is presenting for fever and weakness x 4 days associated with hypotension.     [place]   Spoke w. Pt's PCP. Baseline Scr was 1.2 in 2019   presenting w/ fevers x 6 days  SCr elevated from baseline   Possibly Pre-renal [place] in the setting of fever  vs. ATN From hypotension   SCr did not improve s/p IVF   Pt with diarrhea. Will restart IVF today   FeNa pre-renal   spot urine TP/Cr 0.6g   CBC w/o eosinophils   No hydro on CT Scan   UA bland   avoid nephrotoxins     Acidosis   in the setting of [place]   monitor at present     Anemia  Hb stable    HTN  BP borderline low  optimize hemodynamics

## 2021-06-24 ENCOUNTER — TRANSCRIPTION ENCOUNTER (OUTPATIENT)
Age: 76
End: 2021-06-24

## 2021-06-24 VITALS
OXYGEN SATURATION: 96 % | SYSTOLIC BLOOD PRESSURE: 104 MMHG | RESPIRATION RATE: 18 BRPM | DIASTOLIC BLOOD PRESSURE: 54 MMHG | HEART RATE: 65 BPM | TEMPERATURE: 99 F

## 2021-06-24 LAB
ANION GAP SERPL CALC-SCNC: 11 MMOL/L — SIGNIFICANT CHANGE UP (ref 5–17)
BUN SERPL-MCNC: 20 MG/DL — SIGNIFICANT CHANGE UP (ref 7–23)
CALCIUM SERPL-MCNC: 8 MG/DL — LOW (ref 8.4–10.5)
CHLORIDE SERPL-SCNC: 108 MMOL/L — SIGNIFICANT CHANGE UP (ref 96–108)
CMV DNA CSF QL NAA+PROBE: SIGNIFICANT CHANGE UP
CO2 SERPL-SCNC: 17 MMOL/L — LOW (ref 22–31)
CREAT SERPL-MCNC: 1.41 MG/DL — HIGH (ref 0.5–1.3)
GLUCOSE SERPL-MCNC: 111 MG/DL — HIGH (ref 70–99)
HCT VFR BLD CALC: 27.7 % — LOW (ref 39–50)
HGB BLD-MCNC: 9 G/DL — LOW (ref 13–17)
MCHC RBC-ENTMCNC: 30.3 PG — SIGNIFICANT CHANGE UP (ref 27–34)
MCHC RBC-ENTMCNC: 32.5 GM/DL — SIGNIFICANT CHANGE UP (ref 32–36)
MCV RBC AUTO: 93.3 FL — SIGNIFICANT CHANGE UP (ref 80–100)
NRBC # BLD: 0 /100 WBCS — SIGNIFICANT CHANGE UP (ref 0–0)
PLATELET # BLD AUTO: 90 K/UL — LOW (ref 150–400)
POTASSIUM SERPL-MCNC: 3.8 MMOL/L — SIGNIFICANT CHANGE UP (ref 3.5–5.3)
POTASSIUM SERPL-SCNC: 3.8 MMOL/L — SIGNIFICANT CHANGE UP (ref 3.5–5.3)
RBC # BLD: 2.97 M/UL — LOW (ref 4.2–5.8)
RBC # FLD: 12.4 % — SIGNIFICANT CHANGE UP (ref 10.3–14.5)
SODIUM SERPL-SCNC: 136 MMOL/L — SIGNIFICANT CHANGE UP (ref 135–145)
WBC # BLD: 4.96 K/UL — SIGNIFICANT CHANGE UP (ref 3.8–10.5)
WBC # FLD AUTO: 4.96 K/UL — SIGNIFICANT CHANGE UP (ref 3.8–10.5)

## 2021-06-24 PROCEDURE — 86901 BLOOD TYPING SEROLOGIC RH(D): CPT

## 2021-06-24 PROCEDURE — 74177 CT ABD & PELVIS W/CONTRAST: CPT

## 2021-06-24 PROCEDURE — 86666 EHRLICHIA ANTIBODY: CPT

## 2021-06-24 PROCEDURE — 83735 ASSAY OF MAGNESIUM: CPT

## 2021-06-24 PROCEDURE — 71260 CT THORAX DX C+: CPT

## 2021-06-24 PROCEDURE — 86769 SARS-COV-2 COVID-19 ANTIBODY: CPT

## 2021-06-24 PROCEDURE — 85018 HEMOGLOBIN: CPT

## 2021-06-24 PROCEDURE — 99232 SBSQ HOSP IP/OBS MODERATE 35: CPT

## 2021-06-24 PROCEDURE — 82728 ASSAY OF FERRITIN: CPT

## 2021-06-24 PROCEDURE — 71045 X-RAY EXAM CHEST 1 VIEW: CPT

## 2021-06-24 PROCEDURE — 82570 ASSAY OF URINE CREATININE: CPT

## 2021-06-24 PROCEDURE — 81001 URINALYSIS AUTO W/SCOPE: CPT

## 2021-06-24 PROCEDURE — 82947 ASSAY GLUCOSE BLOOD QUANT: CPT

## 2021-06-24 PROCEDURE — 86850 RBC ANTIBODY SCREEN: CPT

## 2021-06-24 PROCEDURE — 85027 COMPLETE CBC AUTOMATED: CPT

## 2021-06-24 PROCEDURE — 86618 LYME DISEASE ANTIBODY: CPT

## 2021-06-24 PROCEDURE — 86645 CMV ANTIBODY IGM: CPT

## 2021-06-24 PROCEDURE — 99285 EMERGENCY DEPT VISIT HI MDM: CPT

## 2021-06-24 PROCEDURE — 84300 ASSAY OF URINE SODIUM: CPT

## 2021-06-24 PROCEDURE — 87040 BLOOD CULTURE FOR BACTERIA: CPT

## 2021-06-24 PROCEDURE — 82803 BLOOD GASES ANY COMBINATION: CPT

## 2021-06-24 PROCEDURE — 85045 AUTOMATED RETICULOCYTE COUNT: CPT

## 2021-06-24 PROCEDURE — 84132 ASSAY OF SERUM POTASSIUM: CPT

## 2021-06-24 PROCEDURE — 82330 ASSAY OF CALCIUM: CPT

## 2021-06-24 PROCEDURE — 80048 BASIC METABOLIC PNL TOTAL CA: CPT

## 2021-06-24 PROCEDURE — 87207 SMEAR SPECIAL STAIN: CPT

## 2021-06-24 PROCEDURE — 80053 COMPREHEN METABOLIC PANEL: CPT

## 2021-06-24 PROCEDURE — 84295 ASSAY OF SERUM SODIUM: CPT

## 2021-06-24 PROCEDURE — 36000 PLACE NEEDLE IN VEIN: CPT

## 2021-06-24 PROCEDURE — 84145 PROCALCITONIN (PCT): CPT

## 2021-06-24 PROCEDURE — 86753 PROTOZOA ANTIBODY NOS: CPT

## 2021-06-24 PROCEDURE — 83605 ASSAY OF LACTIC ACID: CPT

## 2021-06-24 PROCEDURE — 83550 IRON BINDING TEST: CPT

## 2021-06-24 PROCEDURE — 83540 ASSAY OF IRON: CPT

## 2021-06-24 PROCEDURE — 86617 LYME DISEASE ANTIBODY: CPT

## 2021-06-24 PROCEDURE — C8929: CPT

## 2021-06-24 PROCEDURE — 87449 NOS EACH ORGANISM AG IA: CPT

## 2021-06-24 PROCEDURE — 85014 HEMATOCRIT: CPT

## 2021-06-24 PROCEDURE — 83935 ASSAY OF URINE OSMOLALITY: CPT

## 2021-06-24 PROCEDURE — 86663 EPSTEIN-BARR ANTIBODY: CPT

## 2021-06-24 PROCEDURE — 86664 EPSTEIN-BARR NUCLEAR ANTIGEN: CPT

## 2021-06-24 PROCEDURE — 86665 EPSTEIN-BARR CAPSID VCA: CPT

## 2021-06-24 PROCEDURE — 83615 LACTATE (LD) (LDH) ENZYME: CPT

## 2021-06-24 PROCEDURE — 86803 HEPATITIS C AB TEST: CPT

## 2021-06-24 PROCEDURE — 84100 ASSAY OF PHOSPHORUS: CPT

## 2021-06-24 PROCEDURE — 82435 ASSAY OF BLOOD CHLORIDE: CPT

## 2021-06-24 PROCEDURE — 84156 ASSAY OF PROTEIN URINE: CPT

## 2021-06-24 PROCEDURE — 85025 COMPLETE CBC W/AUTO DIFF WBC: CPT

## 2021-06-24 PROCEDURE — 87086 URINE CULTURE/COLONY COUNT: CPT

## 2021-06-24 PROCEDURE — 82436 ASSAY OF URINE CHLORIDE: CPT

## 2021-06-24 PROCEDURE — 87798 DETECT AGENT NOS DNA AMP: CPT

## 2021-06-24 PROCEDURE — 84484 ASSAY OF TROPONIN QUANT: CPT

## 2021-06-24 PROCEDURE — 0225U NFCT DS DNA&RNA 21 SARSCOV2: CPT

## 2021-06-24 PROCEDURE — 86900 BLOOD TYPING SEROLOGIC ABO: CPT

## 2021-06-24 RX ORDER — SODIUM CHLORIDE 9 MG/ML
1000 INJECTION INTRAMUSCULAR; INTRAVENOUS; SUBCUTANEOUS
Refills: 0 | Status: DISCONTINUED | OUTPATIENT
Start: 2021-06-24 | End: 2021-06-24

## 2021-06-24 RX ORDER — BENZOYL PEROXIDE MICRONIZED 5.8 %
1 TOWELETTE (EA) TOPICAL
Qty: 0 | Refills: 0 | DISCHARGE

## 2021-06-24 RX ORDER — ATOVAQUONE 750 MG/5ML
5 SUSPENSION ORAL
Qty: 70 | Refills: 0
Start: 2021-06-24 | End: 2021-06-30

## 2021-06-24 RX ORDER — LATANOPROST 0.05 MG/ML
1 SOLUTION/ DROPS OPHTHALMIC; TOPICAL
Qty: 0 | Refills: 0 | DISCHARGE

## 2021-06-24 RX ORDER — LATANOPROST 0.05 MG/ML
1 SOLUTION/ DROPS OPHTHALMIC; TOPICAL
Qty: 0 | Refills: 0 | DISCHARGE
Start: 2021-06-24

## 2021-06-24 RX ORDER — AZITHROMYCIN 500 MG/1
1 TABLET, FILM COATED ORAL
Qty: 7 | Refills: 0
Start: 2021-06-24 | End: 2021-06-30

## 2021-06-24 RX ORDER — ATENOLOL 25 MG/1
1 TABLET ORAL
Qty: 0 | Refills: 0 | DISCHARGE

## 2021-06-24 RX ADMIN — AZITHROMYCIN 250 MILLIGRAM(S): 500 TABLET, FILM COATED ORAL at 11:45

## 2021-06-24 RX ADMIN — SODIUM CHLORIDE 70 MILLILITER(S): 9 INJECTION INTRAMUSCULAR; INTRAVENOUS; SUBCUTANEOUS at 10:40

## 2021-06-24 RX ADMIN — ATOVAQUONE 750 MILLIGRAM(S): 750 SUSPENSION ORAL at 05:29

## 2021-06-24 RX ADMIN — ATOVAQUONE 750 MILLIGRAM(S): 750 SUSPENSION ORAL at 16:07

## 2021-06-24 RX ADMIN — Medication 650 MILLIGRAM(S): at 12:07

## 2021-06-24 RX ADMIN — Medication 81 MILLIGRAM(S): at 11:44

## 2021-06-24 RX ADMIN — Medication 100 MILLIGRAM(S): at 06:09

## 2021-06-24 RX ADMIN — Medication 650 MILLIGRAM(S): at 13:07

## 2021-06-24 RX ADMIN — Medication 100 MILLIGRAM(S): at 17:13

## 2021-06-24 NOTE — DISCHARGE NOTE PROVIDER - NSDCMRMEDTOKEN_GEN_ALL_CORE_FT
Aspirin Enteric Coated 81 mg oral delayed release tablet: 1 tab(s) orally once a day  atenolol 25 mg oral tablet: 1 tab(s) orally once a day  Centrum oral tablet: 1 tab(s) orally once a day  Icaps oral tablet, extended release: 1 cap(s) orally once a day  latanoprost 0.005% ophthalmic solution: 1 drop(s) to each affected eye once a day (in the evening)  simvastatin 40 mg oral tablet: 1 tab(s) orally once a day (at bedtime)  Tylenol Extra Strength 500 mg oral tablet: 1 tab(s) orally 1 to 2 times a day, As Needed  Vitamin D3 50,000 intl units oral capsule: 1 cap(s) orally every 7 days    NOTE: unable to verify with secondary source   Aspirin Enteric Coated 81 mg oral delayed release tablet: 1 tab(s) orally once a day  atovaquone 750 mg/5 mL oral suspension: 5 milliliter(s) orally every 12 hours  azithromycin 250 mg oral tablet: 1 tab(s) orally once a day  Centrum oral tablet: 1 tab(s) orally once a day  doxycycline monohydrate 100 mg oral capsule: 1 cap(s) orally every 12 hours  latanoprost 0.005% ophthalmic solution: 1 drop(s) to each affected eye once a day (at bedtime)  simvastatin 40 mg oral tablet: 1 tab(s) orally once a day (at bedtime)  Tylenol Extra Strength 500 mg oral tablet: 1 tab(s) orally 1 to 2 times a day, As Needed  Vitamin D3 50,000 intl units oral capsule: 1 cap(s) orally every 7 days    NOTE: unable to verify with secondary source

## 2021-06-24 NOTE — PROGRESS NOTE ADULT - ASSESSMENT
74 yo M w/ PMHx of CAD s/p stent (on ASA) and CABG, glaucoma, HTN, who is presenting for fever and weakness x 4 days associated with hypotension.    EKG: SR no ischemic changes    1) CAD s/p CABG   -denies CP   -c/w asa and simvastatin  -pt follows with Dr. Coffey  - echo normal LV function     2) Fever  - ID on board   -f/u recs  -on abx   -babesia PCR +    3) DVT PPX  - recommend Heparin

## 2021-06-24 NOTE — DISCHARGE NOTE PROVIDER - PROVIDER TOKENS
FREE:[LAST:[anabela],FIRST:[youseff],PHONE:[(   )    -],FAX:[(   )    -],ADDRESS:[phone]        [phone] [place] [facility] [place] [address]],FOLLOWUP:[1-3 days]]

## 2021-06-24 NOTE — PROGRESS NOTE ADULT - SUBJECTIVE AND OBJECTIVE BOX
[facility]ROLOGY PRACTICE   MD Rob Hernandez MD, D.O. Ruoru Wong, PA    From 7 AM - 5 PM:  OFFICE: [phone]  Dr. Ortega cell: [phone]  Dr. Arnold cell: [phone]  Dr. Guerrero cell: [phone]  KARELY Fabian cell: [phone]    From 5 PM - 7 AM: Answering Service: [phone]  Date of service: 06-24-21 @ 10:07    RENAL FOLLOW UP NOTE  --------------------------------------------------------------------------------  HPI:  Pt seen and examined at bedside.   Grant[place] SOB, chest pain     PAST HISTORY  --------------------------------------------------------------------------------  No significant changes to PMH, PSH, FHx, SHx, unless otherwise noted    ALLERGIES & MEDICATIONS  --------------------------------------------------------------------------------  Allergies    No Known Allergies    Intolerances      Standing Inpatient Medications  aspirin enteric coated 81 milliGRAM(s) Oral daily  atovaquone  Suspension 750 milliGRAM(s) Oral every 12 hours  azithromycin   Tablet 250 milliGRAM(s) Oral daily  doxycycline hyclate Capsule 100 milliGRAM(s) Oral every 12 hours  latanoprost 0.005% Ophthalmic Solution 1 Drop(s) Both EYES at bedtime  simvastatin 40 milliGRAM(s) Oral at bedtime  sodium chloride 0.9%. 1000 milliLiter(s) IV Continuous <Continuous>    PRN Inpatient Medications  acetaminophen   Tablet .. 650 milliGRAM(s) Oral every 6 hours PRN      REVIEW OF SYSTEMS  --------------------------------------------------------------------------------  General: no fever  CVS: no chest pain  RESP: no sob, no cough  ABD: no abdominal pain  : no dysuria,  MSK: no edema     VITALS/PHYSICAL EXAM  --------------------------------------------------------------------------------  T(C): 37.2 (06-24-21 @ 08:41), Max: 37.9 (06-23-21 @ 15:58)  HR: 74 (06-24-21 @ 08:41) (70 - 109)  BP: 99/56 (06-24-21 @ 08:41) (94/52 - 146/69)  RR: 18 (06-24-21 @ 08:41) (18 - 19)  SpO2: 96% (06-24-21 @ 08:41) (92% - 97%)  Wt(kg): --        06-23-21 @ 07:01  -  06-24-21 @ 07:00  --------------------------------------------------------  IN: 940 mL / OUT: 300 mL / NET: 640 mL      Physical Exam:  	Gen: NAD  	HEENT: MMM  	Pulm: CTA B/L  	CV: S1S2  	Abd: Soft, +BS  	Ext: No LE edema B/L                      Neuro: Awake   	Skin: Warm and Dry       LABS/STUDIES  --------------------------------------------------------------------------------              9.0    4.96  >-----------<  90       [06-24-21 @ 06:10]              27.7     136  |  108  |  20  ----------------------------<  111      [06-24-21 @ 06:20]  3.8   |  17  |  1.41        Ca     8.0     [06-24-21 @ 06:20]    TPro  7.0  /  Alb  2.8  /  TBili  0.8  /  DBili  x   /  AST  61  /  ALT  27  /  AlkPhos  62  [06-23-21 @ 06:34]      Creatinine Trend:  SCr 1.41 [06-24 @ 06:20]  SCr 1.57 [06-23 @ 06:34]  SCr 1.71 [06-22 @ 06:30]  SCr 1.71 [06-21 @ 10:22]  SCr 1.56 [06-21 @ 06:11]    Urinalysis - [06-20-21 @ 18:31]      Color Yellow / Appearance Clear / SG 1.011 / pH 6.0      Gluc Negative / Ketone Negative  / Bili Negative / Urobili Negative       Blood Negative / Protein Trace / Leuk Est Negative / Nitrite Negative      RBC 2 / WBC 0 / Hyaline  / Gran  / Sq Epi  / Non Sq Epi 0 / Bacteria Negative    Urine Creatinine 98      [06-22-21 @ 22:15]  Urine Protein 63      [06-22-21 @ 22:15]  Urine Sodium 49      [06-22-21 @ 22:15]  Urine Chloride 45      [06-22-21 @ 22:15]  Urine Osmolality 491      [06-22-21 @ 22:15]    Iron 26, TIBC 249, %sat 10      [06-21-21 @ 15:36]  Ferritin 1446      [06-22-21 @ 14:05]    HCV 0.37, Nonreact      [06-21-21 @ 08:44]

## 2021-06-24 NOTE — DISCHARGE NOTE NURSING/CASE MANAGEMENT/SOCIAL WORK - PATIENT PORTAL LINK FT
You can access the FollowMyHealth Patient Portal offered by [facility] by registering at the following website: [email or web address]. By joining [employer]’s FollowMyHealth portal, you will also be able to view your health information using other applications (apps) compatible with our system.

## 2021-06-24 NOTE — PROGRESS NOTE ADULT - SUBJECTIVE AND OBJECTIVE BOX
Charles Doyle MD  Interventional Cardiology / Endovascular Specialist  [place] Office : [address] [place] [address] [place] [address]  Tel:   [place] Office : [address] [facility] [place] [address]  Tel: [phone]  Cell : [phone]    Subjective/Overnight events: Patient sitting up in bed. denies chest pain or palpitations. Has some SOB on NC  	  MEDICATIONS:  aspirin enteric coated 81 milliGRAM(s) Oral daily    atovaquone  Suspension 750 milliGRAM(s) Oral every 12 hours  azithromycin   Tablet 250 milliGRAM(s) Oral daily  doxycycline hyclate Capsule 100 milliGRAM(s) Oral every 12 hours      acetaminophen   Tablet .. 650 milliGRAM(s) Oral every 6 hours PRN      simvastatin 40 milliGRAM(s) Oral at bedtime    latanoprost 0.005% Ophthalmic Solution 1 Drop(s) Both EYES at bedtime  sodium chloride 0.9%. 1000 milliLiter(s) IV Continuous <Continuous>      PAST MEDICAL/SURGICAL HISTORY  PAST MEDICAL & SURGICAL HISTORY:  Hypercholesteremia    Colon Polyps    Hypertension    CAD (coronary artery disease)    H/O: glaucoma    Bilateral Cataracts  Patient had macular repair 2000.    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 No nausea, vomiting, or hematemesis; No diarrhea or constipation. No melena or hematochezia.  GENITOURINARY: No dysuria, frequency, hematuria, or incontinence  NEUROLOGICAL: No headaches, memory loss, loss of strength, numbness, or tremors  ENDOCRINE: No heat or cold intolerance; No hair loss  MUSCULOSKELETAL: No joint pain or swelling; No muscle, back, or extremity pain  PSYCHIATRIC: No depression, anxiety, mood swings, or difficulty sleeping  HEME/LYMPH: No easy bruising, or bleeding gums  All others negative    PHYSICAL EXAM:  T(C): 37.8 (06-24-21 @ 12:08), Max: 37.9 (06-23-21 @ 15:58)  HR: 77 (06-24-21 @ 12:08) (70 - 86)  BP: 91/51 (06-24-21 @ 12:08) (91/51 - 126/66)  RR: 18 (06-24-21 @ 12:08) (18 - 18)  SpO2: 95% (06-24-21 @ 12:08) (95% - 96%)  Wt(kg): --  I&O's Summary    23 Jun 2021 07:01  -  24 Jun 2021 07:00  --------------------------------------------------------  IN: 940 mL / OUT: 300 mL / NET: 640 mL        GENERAL: NAD  EYES: EOMI, PERRLA, conjunctiva and sclera clear  ENMT: No tonsillar erythema, exudates, or enlargement  Cardiovascular: Normal S1 S2, No JVD, No murmurs, No edema  Respiratory: Lungs b/l rhonchi to auscultation	  Gastrointestinal:  Soft, Non-tender, + BS	  Extremities: No edema                          9.0    4.96  )-----------( 90       ( 24 Jun 2021 06:10 )             27.7     06-24    136  |  108  |  20  ----------------------------<  111<H>  3.8   |  17<L>  |  1.41<H>    Ca    8.0<L>      24 Jun 2021 06:20    TPro  7.0  /  Alb  2.8<L>  /  TBili  0.8  /  DBili  x   /  AST  61<H>  /  ALT  27  /  AlkPhos  62  06-23    proBNP:   Lipid Profile:   HgA1c:   TSH:     Consultant(s) Notes Reviewed:  [x ] YES  [ ] NO    Care Discussed with Consultants/Other Providers [ x] YES  [ ] NO    Imaging Personally Reviewed independently:  [x] YES  [ ] NO    All labs, radiologic studies, vitals, orders and medications list reviewed. Patient is seen and examined at bedside. Case discussed with medical team.

## 2021-06-24 NOTE — PROGRESS NOTE ADULT - PROVIDER SPECIALTY LIST ADULT
Cardiology
Infectious Disease
Nephrology
Infectious Disease
Cardiology
Cardiology
Infectious Disease
Nephrology
Nephrology
Internal Medicine

## 2021-06-24 NOTE — PROGRESS NOTE ADULT - ASSESSMENT
74 yo M w/ PMHx of CAD s/p stent (on ASA) and CABG, glaucoma, HTN, who is presenting for fever and weakness x 4 days associated with hypotension.     Overall fever, relative hypotension, thrombocytopenia, ? anemia vs baseline.   mild transaminitis  given recent visit to [facility], thrombocytopenia need to r/o tick borne infection.   Babesia PCR +       Plan:   c/w doxy in view of the lyme serology  anaplasma/ehrlichia serologies pending-  ,   c/w Azithromycin and mepron for babesia.   tick panel pending.   babesia PCR +  follow up blood cx, NTD   follow up urine cx, NTD   trend CBC for thrombocytopenia.   trend Cr  LDH elevated   will need therapy for total 10 days   pt to follow up as needed.       Plan discussed with Medicine Attending     lAl Cerrato  Pager: [phone]. If no response or past 5 pm call [phone].

## 2021-06-24 NOTE — DISCHARGE NOTE PROVIDER - NSDCCPCAREPLAN_GEN_ALL_CORE_FT
PRINCIPAL DISCHARGE DIAGNOSIS  Diagnosis: Fever  Assessment and Plan of Treatment: 74 yo M w/ PMHx of CAD s/p stent (on ASA) and CABG, glaucoma, HTN, who is presenting for fever and weakness x 4 days associated with hypotension.   Overall fever, relative hypotension, low platelets and elevated liver enzymes might be from recent visit to [place], thrombocytopenia need to r/o tick borne infection.   Babesia PCR + Patient treated with  anaplasma/ehrlichia  started Azithromycin and mepron for babesia. for 7 more days  follow parasitemia ;babesia PCR +. Please follow up with infectious Dr Shah  outpatient and PMD        SECONDARY DISCHARGE DIAGNOSES  Diagnosis: Coronary artery disease involving native coronary artery of native heart without angina pectoris  Assessment and Plan of Treatment: Coronary artery disease is a condition where the arteries the supply the heart muscle get clogges with fatty deposits & puts you at risk for a heart attack  Call your doctor if you have any new pain, pressure, or discomfort in the center of your chest, pain, tingling or discomfort in arms, back, neck, jaw, or stomach, shortness of breath, nausea, vomiting, burping or heartburn, sweating, cold and clammy skin, racing or abnormal heartbeat for more than 10 minutes or if they keep coming & going.  Call 911 and do not tr to get to hospital by care  You can help yourself with lefestyle changes (quitting smoking if you smoke), eat lots of fruits & vegetables & low fat dairy products, not a lot of meat & fatty foods, walk or some form of physical activity most days of the week, lose weight if you are overweight  Take your cardiac medication as prescribed to lower cholesterol, to lower blood pressure, aspirin to prevent blood clots, and diabetes control  Make sure to keep appointments with doctor for cardiac follow up care      Diagnosis: Glaucoma of both eyes, unspecified glaucoma type  Assessment and Plan of Treatment: c/w latanoprost and follow up with opthalmology    Diagnosis: Essential hypertension  Assessment and Plan of Treatment: Follow up with your medical doctor to establish long term blood pressure treatment goals.       PRINCIPAL DISCHARGE DIAGNOSIS  Diagnosis: Fever  Assessment and Plan of Treatment: 76 yo M w/ PMHx of CAD s/p stent (on ASA) and CABG, glaucoma, HTN, who is presenting for fever and weakness x 4 days associated with hypotension.   Overall fever, relative hypotension, low platelets and elevated liver enzymes might be from recent visit to [place], thrombocytopenia need to r/o tick borne infection.   Babesia PCR + Patient treated with  anaplasma/ehrlichia  started Azithromycin and mepron for babesia. for 7 more days  follow parasitemia ;babesia PCR +. Please follow up with infectious Dr Shah  outpatient and PMD        SECONDARY DISCHARGE DIAGNOSES  Diagnosis: Coronary artery disease involving native coronary artery of native heart without angina pectoris  Assessment and Plan of Treatment: Coronary artery disease is a condition where the arteries the supply the heart muscle get clogges with fatty deposits & puts you at risk for a heart attack  Call your doctor if you have any new pain, pressure, or discomfort in the center of your chest, pain, tingling or discomfort in arms, back, neck, jaw, or stomach, shortness of breath, nausea, vomiting, burping or heartburn, sweating, cold and clammy skin, racing or abnormal heartbeat for more than 10 minutes or if they keep coming & going.  Call 911 and do not tr to get to hospital by care  You can help yourself with lefestyle changes (quitting smoking if you smoke), eat lots of fruits & vegetables & low fat dairy products, not a lot of meat & fatty foods, walk or some form of physical activity most days of the week, lose weight if you are overweight  Take your cardiac medication as prescribed to lower cholesterol, to lower blood pressure, aspirin to prevent blood clots, and diabetes control  Make sure to keep appointments with doctor for cardiac follow up care      Diagnosis: Glaucoma of both eyes, unspecified glaucoma type  Assessment and Plan of Treatment: c/w latanoprost and follow up with opthalmology    Diagnosis: Essential hypertension  Assessment and Plan of Treatment: Follow up with your medical doctor to establish long term blood pressure treatment goals. Patient with blood pressure 90/60 , will hold atenolol and advised to follow up with PMD

## 2021-06-24 NOTE — DISCHARGE NOTE PROVIDER - HOSPITAL COURSE
76 yo M w/ PMHx of CAD s/p stent (on ASA) and CABG, glaucoma, HTN, who is presenting for fever and weakness x 4 days associated with hypotension. Patient reports that he has had fevers and chills x 4 days. Has felt generally weak, lethargic. Laid in bed a lot. No focal signs localizing infection however, no sore throat, no cough, no diarrhea no nausea or vomiting, no dysuria, no chest pain, no rashes (has some hyperpigmentation that has been there for some time in groin folds but no rash). Lives with wife who has not had any fevers/chills. No recent dental work. Last colonoscopy 5-6 years ago with some polyps that were removed. No recent surgeries/travel/sick contacts. Had COVID19 vaccine second dose in February.      Overall fever, relative hypotension, thrombocytopenia, ? anemia vs baseline.   mild transaminitis given recent visit to [place], thrombocytopenia need to r/o tick borne infection.   Babesia PCR + . Patient treated with Mepron, Zithromax and doxycycline  in view of the lyme serology (+babesia)   Patient no longer with fevers and hemodynamically stable for discharge. Patient to follow up with infectious disease and PCP.   74 yo M w/ PMHx of CAD s/p stent (on ASA) and CABG, glaucoma, HTN, who is presenting for fever and weakness x 4 days associated with hypotension. Patient reports that he has had fevers and chills x 4 days. Has felt generally weak, lethargic. Laid in bed a lot. No focal signs localizing infection however, no sore throat, no cough, no diarrhea no nausea or vomiting, no dysuria, no chest pain, no rashes (has some hyperpigmentation that has been there for some time in groin folds but no rash). Lives with wife who has not had any fevers/chills. No recent dental work. Last colonoscopy 5-6 years ago with some polyps that were removed. No recent surgeries/travel/sick contacts. Had COVID19 vaccine second dose in February.      Overall fever, relative hypotension, thrombocytopenia, ? anemia vs baseline.   mild transaminitis given recent visit to [place], thrombocytopenia need to r/o tick borne infection.    Cultures were obtained and show Babesia PCR + . Patient treated with Mepron, Zithromax and doxycycline  in view of the lyme serology (+babesia)   Patient no longer with fevers and hemodynamically stable for discharge. Patient to follow up with infectious disease and PCP.   76 yo M w/ PMHx of CAD s/p stent (on ASA) and CABG, glaucoma, HTN, who is presenting for fever and weakness x 4 days associated with hypotension. Patient reports that he has had fevers and chills x 4 days. Has felt generally weak, lethargic. Laid in bed a lot. No focal signs localizing infection however, no sore throat, no cough, no diarrhea no nausea or vomiting, no dysuria, no chest pain, no rashes (has some hyperpigmentation that has been there for some time in groin folds but no rash). Lives with wife who has not had any fevers/chills. No recent dental work. Last colonoscopy 5-6 years ago with some polyps that were removed. No recent surgeries/travel/sick contacts. Had COVID19 vaccine second dose in February.      Overall fever, relative hypotension, thrombocytopenia, ? anemia vs baseline.   mild transaminitis given recent visit to [place], thrombocytopenia need to r/o tick borne infection.    Cultures were obtained and show Babesia PCR + . Patient treated with Mepron, Zithromax and doxycycline  in view of the lyme serology (+babesia)   Patient no longer with fevers and hemodynamically stable for discharge. Patient to follow up with infectious disease and PCP. Hoospital course stable except for low blood pressure, atenolol on hold and patient will follow up outpatient with his PCP.

## 2021-06-24 NOTE — PROGRESS NOTE ADULT - SUBJECTIVE AND OBJECTIVE BOX
75y old  Male who presents with a chief complaint of Fever (24 Jun 2021 15:14)      Interval history:    Allergies:   No Known Allergies    Antimicrobials:    atovaquone  Suspension 750 milliGRAM(s) Oral every 12 hours  azithromycin   Tablet 250 milliGRAM(s) Oral daily  doxycycline hyclate Capsule 100 milliGRAM(s) Oral every 12 hours    REVIEW OF SYSTEMS:    No chest pain or palpitations  No cough, no SOB  No N/V/D, no abdominal pain  No dysuria or frequency  No rash.     Vital Signs Last 24 Hrs  T(C): 37 (06-24-21 @ 16:07), Max: 37.8 (06-24-21 @ 12:08)  T(F): 98.6 (06-24-21 @ 16:07), Max: 100.1 (06-24-21 @ 12:08)  HR: 65 (06-24-21 @ 16:07) (65 - 77)  BP: 104/54 (06-24-21 @ 16:07) (91/51 - 105/55)  BP(mean): --  RR: 18 (06-24-21 @ 16:07) (18 - 18)  SpO2: 96% (06-24-21 @ 16:07) (95% - 96%)    PHYSICAL EXAM:                            9.0    4.96  )-----------( 90       ( 24 Jun 2021 06:10 )             27.7   06-24    136  |  108  |  20  ----------------------------<  111<H>  3.8   |  17<L>  |  1.41<H>    Ca    8.0<L>      24 Jun 2021 06:20    TPro  7.0  /  Alb  2.8<L>  /  TBili  0.8  /  DBili  x   /  AST  61<H>  /  ALT  27  /  AlkPhos  62  06-23      LIVER FUNCTIONS - ( 23 Jun 2021 06:34 )  Alb: 2.8 g/dL / Pro: 7.0 g/dL / ALK PHOS: 62 U/L / ALT: 27 U/L / AST: 61 U/L / GGT: x               Culture - Blood (collected 23 Jun 2021 05:30)  Source: .Blood Blood-Peripheral  Preliminary Report (24 Jun 2021 06:02):    No growth to date.    Culture - Blood (collected 23 Jun 2021 05:30)  Source: .Blood Blood-Peripheral  Preliminary Report (24 Jun 2021 06:02):    No growth to date.    Parasitemia, Blood (collected 22 Jun 2021 20:41)  Source: .Blood  Final Report (23 Jun 2021 09:59):    Babesia species by [employer] Stain    Parasitemia = 1.2 %    Babesia microti PCR, Blood. (collected 22 Jun 2021 09:07)  Source: .Blood  Final Report (22 Jun 2021 11:42):    Babesia microti PCR    Results: DETECTED    ***************Result Note*************    Parasitemia is not routinely performed.    If needed, call the microbiology laboratory.    Order "Parasitemia, Blood" for monitoring.    The detection of Babesia microti by PCR has only been    validated for whole blood; this test has not been approved    by the US Food and Drug Administration (FDA). Performance    characteristics of this assay have been determined by    [facility]. The clinical significance    of results should be considered in conjunction with the    overall clinical presentation of the patient. Result is not    intended to be used as the sole means for clinical diagnosis    or patient management decisions.        Radiology:       75y old  Male who presents with a chief complaint of Fever (24 Jun 2021 15:14)      Interval history:  Afebrile, feeling well, offers no new complains.       Allergies:   No Known Allergies      Antimicrobials:  atovaquone  Suspension 750 milliGRAM(s) Oral every 12 hours  azithromycin   Tablet 250 milliGRAM(s) Oral daily  doxycycline hyclate Capsule 100 milliGRAM(s) Oral every 12 hours      REVIEW OF SYSTEMS:  No chest pain  No SOB  No abdominal pain  No rash.       Vital Signs Last 24 Hrs  T(C): 37 (06-24-21 @ 16:07), Max: 37.8 (06-24-21 @ 12:08)  T(F): 98.6 (06-24-21 @ 16:07), Max: 100.1 (06-24-21 @ 12:08)  HR: 65 (06-24-21 @ 16:07) (65 - 77)  BP: 104/54 (06-24-21 @ 16:07) (91/51 - 105/55)  BP(mean): --  RR: 18 (06-24-21 @ 16:07) (18 - 18)  SpO2: 96% (06-24-21 @ 16:07) (95% - 96%)      PHYSICAL EXAM:  Patient in no acute distress. AAOX3.  Cardiovascular: S1S2 normal.  Lungs: Good air entry B/L lung fields.  Gastrointestinal: soft, nontender, nondistended.  Extremities: no edema.  IV sites not inflamed.                           9.0    4.96  )-----------( 90       ( 24 Jun 2021 06:10 )             27.7   06-24    136  |  108  |  20  ----------------------------<  111<H>  3.8   |  17<L>  |  1.41<H>    Ca    8.0<L>      24 Jun 2021 06:20    TPro  7.0  /  Alb  2.8<L>  /  TBili  0.8  /  DBili  x   /  AST  61<H>  /  ALT  27  /  AlkPhos  62  06-23      LIVER FUNCTIONS - ( 23 Jun 2021 06:34 )  Alb: 2.8 g/dL / Pro: 7.0 g/dL / ALK PHOS: 62 U/L / ALT: 27 U/L / AST: 61 U/L / GGT: x               Culture - Blood (collected 23 Jun 2021 05:30)  Source: .Blood Blood-Peripheral  Preliminary Report (24 Jun 2021 06:02):    No growth to date.    Culture - Blood (collected 23 Jun 2021 05:30)  Source: .Blood Blood-Peripheral  Preliminary Report (24 Jun 2021 06:02):    No growth to date.    Parasitemia, Blood (collected 22 Jun 2021 20:41)  Source: .Blood  Final Report (23 Jun 2021 09:59):    Babesia species by Giemsa Stain    Parasitemia = 1.2 %    Babesia microti PCR, Blood. (collected 22 Jun 2021 09:07)  Source: .Blood  Final Report (22 Jun 2021 11:42):    Babesia microti PCR    Results: DETECTED    ***************Result Note*************    Parasitemia is not routinely performed.    If needed, call the microbiology laboratory.    Order "Parasitemia, Blood" for monitoring.    The detection of Babesia microti by PCR has only been    validated for whole blood; this test has not been approved    by the US Food and Drug Administration (FDA). Performance    characteristics of this assay have been determined by    [facility]. The clinical significance    of results should be considered in conjunction with the    overall clinical presentation of the patient. Result is not    intended to be used as the sole means for clinical diagnosis    or patient management decisions.

## 2021-06-24 NOTE — PROGRESS NOTE ADULT - ASSESSMENT
76 yo M w/ PMHx of CAD s/p stent (on ASA) and CABG, glaucoma, HTN, who is presenting for fever and weakness x 4 days associated with hypotension.     [place]   Spoke w. Pt's PCP. Baseline Scr was 1.2 in 2019   presenting w/ fevers x 6 days  SCr elevated from baseline   Possibly Pre-renal [place] in the setting of fever  vs. 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 Should improve w/ treatment   Please continue IVF as tolerated today   FeNa pre-renal   spot urine TP/Cr 0.6g   CBC w/o eosinophils   No hydro on CT Scan   UA bland   avoid nephrotoxins     Acidosis   in the setting of [place]   monitor at present     Anemia  Hb stable    HTN  BP borderline low  optimize hemodynamics

## 2021-06-24 NOTE — DISCHARGE NOTE PROVIDER - CARE PROVIDER_API CALL
lnio peñaloza  [phone]        [phone] [facility] [place],   Phone: (   )    -  Fax: (   )    -  Follow Up Time: 1-3 days

## 2021-06-24 NOTE — PROGRESS NOTE ADULT - NSICDXPILOT_GEN_ALL_CORE
[place]

## 2021-06-25 LAB
B BURGDOR AB SER-IMP: POSITIVE
B BURGDOR18KD IGG SER QL IB: PRESENT
B BURGDOR23KD IGG SER QL IB: SIGNIFICANT CHANGE UP
B BURGDOR23KD IGM SER QL IB: PRESENT
B BURGDOR28KD IGG SER QL IB: SIGNIFICANT CHANGE UP
B BURGDOR30KD IGG SER QL IB: SIGNIFICANT CHANGE UP
B BURGDOR31KD IGG SER QL IB: SIGNIFICANT CHANGE UP
B BURGDOR39KD IGG SER QL IB: PRESENT
B BURGDOR39KD IGM SER QL IB: SIGNIFICANT CHANGE UP
B BURGDOR41KD IGG SER QL IB: PRESENT
B BURGDOR41KD IGM SER QL IB: PRESENT
B BURGDOR45KD IGG SER QL IB: SIGNIFICANT CHANGE UP
B BURGDOR58KD IGG SER QL IB: PRESENT
B BURGDOR66KD IGG SER QL IB: PRESENT
B BURGDOR93KD IGG SER QL IB: SIGNIFICANT CHANGE UP
CULTURE RESULTS: SIGNIFICANT CHANGE UP
CULTURE RESULTS: SIGNIFICANT CHANGE UP
LYME WB IGM INTERPRETATION: POSITIVE
SPECIMEN SOURCE: SIGNIFICANT CHANGE UP
SPECIMEN SOURCE: SIGNIFICANT CHANGE UP

## 2021-06-28 LAB
CULTURE RESULTS: SIGNIFICANT CHANGE UP
CULTURE RESULTS: SIGNIFICANT CHANGE UP
SPECIMEN SOURCE: SIGNIFICANT CHANGE UP
SPECIMEN SOURCE: SIGNIFICANT CHANGE UP

## 2021-06-30 PROBLEM — Z86.69 PERSONAL HISTORY OF OTHER DISEASES OF THE NERVOUS SYSTEM AND SENSE ORGANS: Chronic | Status: ACTIVE | Noted: 2021-06-21

## 2021-07-07 LAB
A PHAGOCYTOPH IGG TITR SER IF: SIGNIFICANT CHANGE UP
B BURGDOR AB SER QL IA: SIGNIFICANT CHANGE UP
B MICROTI IGG TITR SER: SIGNIFICANT CHANGE UP
E CHAFFEENSIS IGG TITR SER IF: SIGNIFICANT CHANGE UP

## 2021-07-08 ENCOUNTER — NON-APPOINTMENT (OUTPATIENT)
Age: 76
End: 2021-07-08

## 2021-07-08 ENCOUNTER — APPOINTMENT (OUTPATIENT)
Dept: INFECTIOUS DISEASE | Facility: CLINIC | Age: 76
End: 2021-07-08
Payer: MEDICARE

## 2021-07-08 VITALS
OXYGEN SATURATION: 98 % | WEIGHT: 123 LBS | BODY MASS INDEX: 21.79 KG/M2 | TEMPERATURE: 98.5 F | HEIGHT: 63 IN | HEART RATE: 94 BPM | SYSTOLIC BLOOD PRESSURE: 141 MMHG | DIASTOLIC BLOOD PRESSURE: 84 MMHG

## 2021-07-08 DIAGNOSIS — B60.00 BABESIOSIS, UNSPECIFIED: ICD-10-CM

## 2021-07-08 PROCEDURE — 99213 OFFICE O/P EST LOW 20 MIN: CPT

## 2021-07-09 PROBLEM — B60.00 BABESIOSIS: Status: ACTIVE | Noted: 2021-07-09

## 2021-07-09 NOTE — PHYSICAL EXAM
[General Appearance - Alert] : alert [General Appearance - In No Acute Distress] : in no acute distress [Sclera] : the sclera and conjunctiva were normal [] : the neck was supple [Auscultation Breath Sounds / Voice Sounds] : lungs were clear to auscultation bilaterally [Heart Sounds] : normal S1 and S2 [Abdomen Soft] : soft [Abdomen Tenderness] : non-tender [Skin Lesions] : no skin lesions [Oriented To Time, Place, And Person] : oriented to person, place, and time

## 2021-07-09 NOTE — REVIEW OF SYSTEMS
[Fever] : no fever [Chills] : no chills [Discharge From Eyes] : no purulent discharge from the eyes [Shortness Of Breath] : no shortness of breath [Cough] : no cough [Abdominal Pain] : no abdominal pain [Vomiting] : no vomiting [Dysuria] : no dysuria [Joint Pain] : no joint pain [Skin Lesions] : no skin lesions [Anxiety] : no anxiety

## 2021-07-09 NOTE — ASSESSMENT
[FreeTextEntry1] : 74 yo M w/ PMHx of CAD s/p stent (on ASA) and CABG, glaucoma, HTN, who presented for fever and weakness x 4 days associated with hypotension. 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 \par discharged on Azithromycin and mepron for babesia and doxy. \par He is here for follow up, feels well \par Anaplasma and ehrlichia PCR negative \par pt s/p completion of therapy \par BP currently a little on higher side rather than lower, which is acceptable for now.\par pt to follow with PMD for closer monitoring. \par pt to follow up as needed. \par all questions answered\par \par

## 2021-09-17 ENCOUNTER — NON-APPOINTMENT (OUTPATIENT)
Age: 76
End: 2021-09-17

## 2021-09-17 ENCOUNTER — APPOINTMENT (OUTPATIENT)
Dept: CARDIOLOGY | Facility: CLINIC | Age: 76
End: 2021-09-17
Payer: MEDICARE

## 2021-09-17 VITALS
HEIGHT: 63 IN | WEIGHT: 123 LBS | DIASTOLIC BLOOD PRESSURE: 80 MMHG | OXYGEN SATURATION: 99 % | HEART RATE: 72 BPM | BODY MASS INDEX: 21.79 KG/M2 | SYSTOLIC BLOOD PRESSURE: 149 MMHG

## 2021-09-17 PROCEDURE — 93000 ELECTROCARDIOGRAM COMPLETE: CPT

## 2021-09-17 PROCEDURE — 99214 OFFICE O/P EST MOD 30 MIN: CPT

## 2021-09-17 RX ORDER — ATENOLOL 25 MG/1
25 TABLET ORAL DAILY
Qty: 30 | Refills: 4 | Status: DISCONTINUED | COMMUNITY
End: 2021-09-17

## 2021-09-17 RX ORDER — ATENOLOL 25 MG/1
25 TABLET ORAL DAILY
Qty: 90 | Refills: 3 | Status: ACTIVE | COMMUNITY
Start: 2021-09-17

## 2021-10-05 NOTE — DISCUSSION/SUMMARY
[FreeTextEntry1] : CAD/CP-no sx\par \par HTN- stable\par \par HLD- controlled\par \par Followup in 6 mths\par \par Time spent reviewing history, exam, pt discussion, and note writing

## 2022-04-04 ENCOUNTER — NON-APPOINTMENT (OUTPATIENT)
Age: 77
End: 2022-04-04

## 2022-04-04 ENCOUNTER — APPOINTMENT (OUTPATIENT)
Dept: CARDIOLOGY | Facility: CLINIC | Age: 77
End: 2022-04-04
Payer: MEDICARE

## 2022-04-04 VITALS
DIASTOLIC BLOOD PRESSURE: 72 MMHG | HEART RATE: 68 BPM | HEIGHT: 63 IN | WEIGHT: 125 LBS | BODY MASS INDEX: 22.15 KG/M2 | OXYGEN SATURATION: 99 % | SYSTOLIC BLOOD PRESSURE: 157 MMHG

## 2022-04-04 PROCEDURE — 99214 OFFICE O/P EST MOD 30 MIN: CPT

## 2022-04-04 PROCEDURE — 93000 ELECTROCARDIOGRAM COMPLETE: CPT

## 2022-04-17 NOTE — HISTORY OF PRESENT ILLNESS
[FreeTextEntry1] : 76 year old male with h/o CAD s/p CABG, HTN, HLD who feels well without any c/o CP, SOB or H/A

## 2022-04-17 NOTE — DISCUSSION/SUMMARY
[FreeTextEntry1] : CAD/CP-no sx\par \par HTN- stable and better controlled on home measurements\par \par HLD- controlled\par \par Followup in 6 mths\par \par Time spent reviewing history, exam, pt discussion, and note writing

## 2022-04-17 NOTE — PHYSICAL EXAM
[General Appearance - Well Developed] : well developed [Normal Appearance] : normal appearance [General Appearance - Well Nourished] : well nourished [Well Groomed] : well groomed [No Deformities] : no deformities [General Appearance - In No Acute Distress] : no acute distress [Normal Conjunctiva] : the conjunctiva exhibited no abnormalities [Normal Oral Mucosa] : normal oral mucosa [Eyelids - No Xanthelasma] : the eyelids demonstrated no xanthelasmas [No Oral Pallor] : no oral pallor [No Oral Cyanosis] : no oral cyanosis [Normal Jugular Venous A Waves Present] : normal jugular venous A waves present [Normal Jugular Venous V Waves Present] : normal jugular venous V waves present [No Jugular Venous Myers A Waves] : no jugular venous myers A waves [Respiration, Rhythm And Depth] : normal respiratory rhythm and effort [Exaggerated Use Of Accessory Muscles For Inspiration] : no accessory muscle use [Auscultation Breath Sounds / Voice Sounds] : lungs were clear to auscultation bilaterally [Heart Rate And Rhythm] : heart rate and rhythm were normal [Heart Sounds] : normal S1 and S2 [Murmurs] : no murmurs present [Abdomen Soft] : soft [Abdomen Tenderness] : non-tender [Abdomen Mass (___ Cm)] : no abdominal mass palpated [Gait - Sufficient For Exercise Testing] : the gait was sufficient for exercise testing [Abnormal Walk] : normal gait [Nail Clubbing] : no clubbing of the fingernails [Cyanosis, Localized] : no localized cyanosis [Petechial Hemorrhages (___cm)] : no petechial hemorrhages [Skin Color & Pigmentation] : normal skin color and pigmentation [] : no rash [No Venous Stasis] : no venous stasis [Skin Lesions] : no skin lesions [No Skin Ulcers] : no skin ulcer [No Xanthoma] : no  xanthoma was observed [Oriented To Time, Place, And Person] : oriented to person, place, and time [Affect] : the affect was normal [Mood] : the mood was normal [No Anxiety] : not feeling anxious

## 2022-10-19 ENCOUNTER — NON-APPOINTMENT (OUTPATIENT)
Age: 77
End: 2022-10-19

## 2022-10-19 ENCOUNTER — APPOINTMENT (OUTPATIENT)
Dept: CARDIOLOGY | Facility: CLINIC | Age: 77
End: 2022-10-19

## 2022-10-19 VITALS
DIASTOLIC BLOOD PRESSURE: 66 MMHG | HEART RATE: 56 BPM | BODY MASS INDEX: 22.15 KG/M2 | WEIGHT: 125 LBS | SYSTOLIC BLOOD PRESSURE: 144 MMHG | OXYGEN SATURATION: 98 % | HEIGHT: 63 IN

## 2022-10-19 PROCEDURE — 93000 ELECTROCARDIOGRAM COMPLETE: CPT

## 2022-10-19 PROCEDURE — 99214 OFFICE O/P EST MOD 30 MIN: CPT

## 2022-10-19 RX ORDER — AZITHROMYCIN 250 MG/1
250 TABLET, FILM COATED ORAL
Qty: 6 | Refills: 0 | Status: DISCONTINUED | COMMUNITY
Start: 2022-05-05

## 2022-10-19 RX ORDER — OSELTAMIVIR PHOSPHATE 75 MG/1
75 CAPSULE ORAL
Qty: 10 | Refills: 0 | Status: DISCONTINUED | COMMUNITY
Start: 2022-05-05

## 2022-11-12 NOTE — HISTORY OF PRESENT ILLNESS
[FreeTextEntry1] : 77 year old male with h/o CAD s/p CABG, HTN, HLD who feels well without any c/o CP, SOB or H/A

## 2022-11-12 NOTE — PHYSICAL EXAM
[General Appearance - Well Developed] : well developed [Normal Appearance] : normal appearance [Well Groomed] : well groomed [General Appearance - Well Nourished] : well nourished [No Deformities] : no deformities [General Appearance - In No Acute Distress] : no acute distress [Normal Conjunctiva] : the conjunctiva exhibited no abnormalities [Eyelids - No Xanthelasma] : the eyelids demonstrated no xanthelasmas [Normal Oral Mucosa] : normal oral mucosa [No Oral Pallor] : no oral pallor [Normal Jugular Venous A Waves Present] : normal jugular venous A waves present [No Oral Cyanosis] : no oral cyanosis [Normal Jugular Venous V Waves Present] : normal jugular venous V waves present [No Jugular Venous Myers A Waves] : no jugular venous myers A waves [Exaggerated Use Of Accessory Muscles For Inspiration] : no accessory muscle use [Respiration, Rhythm And Depth] : normal respiratory rhythm and effort [Auscultation Breath Sounds / Voice Sounds] : lungs were clear to auscultation bilaterally [Heart Rate And Rhythm] : heart rate and rhythm were normal [Heart Sounds] : normal S1 and S2 [Murmurs] : no murmurs present [Abdomen Soft] : soft [Abdomen Tenderness] : non-tender [Abdomen Mass (___ Cm)] : no abdominal mass palpated [Abnormal Walk] : normal gait [Gait - Sufficient For Exercise Testing] : the gait was sufficient for exercise testing [Nail Clubbing] : no clubbing of the fingernails [Cyanosis, Localized] : no localized cyanosis [Petechial Hemorrhages (___cm)] : no petechial hemorrhages [Skin Color & Pigmentation] : normal skin color and pigmentation [] : no rash [No Venous Stasis] : no venous stasis [Skin Lesions] : no skin lesions [No Skin Ulcers] : no skin ulcer [No Xanthoma] : no  xanthoma was observed [Affect] : the affect was normal [Oriented To Time, Place, And Person] : oriented to person, place, and time [Mood] : the mood was normal [No Anxiety] : not feeling anxious

## 2022-11-12 NOTE — DISCUSSION/SUMMARY
[FreeTextEntry1] : CAD/CP-no sx\par \par HTN- stable and better controlled on home measurements\par \par HLD- controlled\par \par Followup in 6 mths\par \par Time spent reviewing history, exam, pt discussion, and note writing [EKG obtained to assist in diagnosis and management of assessed problem(s)] : EKG obtained to assist in diagnosis and management of assessed problem(s)

## 2022-11-28 NOTE — HISTORY OF PRESENT ILLNESS
[FreeTextEntry1] : 76 yo M w/ PMHx of CAD s/p stent (on ASA) and CABG, glaucoma, HTN, who presented for fever and weakness x 4 days associated with hypotension. 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 \par discharged on Azithromycin and mepron for babesia and doxy. \par He is here for follow up, feels well except some fatigue especially in morning and BP running a little low still, though he denies any light headedness or SOB. \par \par  [FreeTextEntry1] : Most noted complaint is related to her PAD. She has significant claudication. She continues to follow with Abhi\par Ms. MENDEZ has a history of CAD. He denies a change in symptoms. He has no chest pain. No SOB. No palpitations. \par Recently saw Dr. Barrios who rec 1 year follow up. \par Labs per patient were ok.

## 2023-03-31 ENCOUNTER — NON-APPOINTMENT (OUTPATIENT)
Age: 78
End: 2023-03-31

## 2023-04-03 ENCOUNTER — APPOINTMENT (OUTPATIENT)
Dept: CARDIOLOGY | Facility: CLINIC | Age: 78
End: 2023-04-03
Payer: MEDICARE

## 2023-04-03 ENCOUNTER — NON-APPOINTMENT (OUTPATIENT)
Age: 78
End: 2023-04-03

## 2023-04-03 VITALS — HEART RATE: 60 BPM | DIASTOLIC BLOOD PRESSURE: 71 MMHG | SYSTOLIC BLOOD PRESSURE: 158 MMHG | OXYGEN SATURATION: 100 %

## 2023-04-03 PROCEDURE — 99214 OFFICE O/P EST MOD 30 MIN: CPT

## 2023-04-03 PROCEDURE — 93000 ELECTROCARDIOGRAM COMPLETE: CPT

## 2023-08-07 ENCOUNTER — APPOINTMENT (OUTPATIENT)
Dept: CARDIOLOGY | Facility: CLINIC | Age: 78
End: 2023-08-07
Payer: MEDICARE

## 2023-08-07 VITALS
HEIGHT: 63 IN | WEIGHT: 125 LBS | BODY MASS INDEX: 22.15 KG/M2 | HEART RATE: 56 BPM | OXYGEN SATURATION: 100 % | DIASTOLIC BLOOD PRESSURE: 77 MMHG | SYSTOLIC BLOOD PRESSURE: 155 MMHG

## 2023-08-07 PROCEDURE — 93000 ELECTROCARDIOGRAM COMPLETE: CPT

## 2023-08-07 PROCEDURE — 99214 OFFICE O/P EST MOD 30 MIN: CPT

## 2023-08-07 RX ORDER — ATORVASTATIN CALCIUM 40 MG/1
40 TABLET, FILM COATED ORAL
Refills: 0 | Status: ACTIVE | COMMUNITY
Start: 2023-07-23

## 2023-08-07 RX ORDER — SIMVASTATIN 40 MG/1
40 TABLET, FILM COATED ORAL DAILY
Refills: 0 | Status: DISCONTINUED | COMMUNITY
End: 2023-08-07

## 2024-01-25 NOTE — REVIEW OF SYSTEMS
[Negative] : Heme/Lymph
At this time, pt is functioning in his roles, self sufficient, driving & ambulating independently at home and in the community with a SAC. Pt owns a rolling walker and 3:1 commode. Pt has an antalgic gait. Pt c/o 3/10 pain in his left knee  at rest and 7/10 at worse. The pain is exacerbated, by walking, prolonged standing, negotiating steps and is relieved with rest. Pt is right hand dominant, wears glasses for reading and distance. .

## 2024-05-01 ENCOUNTER — APPOINTMENT (OUTPATIENT)
Dept: CARDIOLOGY | Facility: CLINIC | Age: 79
End: 2024-05-01
Payer: MEDICARE

## 2024-05-01 VITALS
SYSTOLIC BLOOD PRESSURE: 147 MMHG | BODY MASS INDEX: 22.15 KG/M2 | HEART RATE: 62 BPM | WEIGHT: 125 LBS | HEIGHT: 63 IN | OXYGEN SATURATION: 98 % | DIASTOLIC BLOOD PRESSURE: 69 MMHG

## 2024-05-01 DIAGNOSIS — I10 ESSENTIAL (PRIMARY) HYPERTENSION: ICD-10-CM

## 2024-05-01 DIAGNOSIS — E78.5 HYPERLIPIDEMIA, UNSPECIFIED: ICD-10-CM

## 2024-05-01 DIAGNOSIS — I25.10 ATHEROSCLEROTIC HEART DISEASE OF NATIVE CORONARY ARTERY W/OUT ANGINA PECTORIS: ICD-10-CM

## 2024-05-01 PROCEDURE — 99214 OFFICE O/P EST MOD 30 MIN: CPT

## 2024-05-01 PROCEDURE — 93000 ELECTROCARDIOGRAM COMPLETE: CPT

## 2024-05-04 ENCOUNTER — EMERGENCY (EMERGENCY)
Facility: HOSPITAL | Age: 79
LOS: 1 days | Discharge: HOME | End: 2024-05-04
Attending: EMERGENCY MEDICINE
Payer: COMMERCIAL

## 2024-05-04 VITALS
RESPIRATION RATE: 18 BRPM | OXYGEN SATURATION: 99 % | HEART RATE: 67 BPM | SYSTOLIC BLOOD PRESSURE: 153 MMHG | TEMPERATURE: 98 F | HEIGHT: 63 IN | WEIGHT: 125 LBS | DIASTOLIC BLOOD PRESSURE: 73 MMHG

## 2024-05-04 LAB
ALBUMIN SERPL ELPH-MCNC: 3.7 G/DL — SIGNIFICANT CHANGE UP (ref 3.3–5)
ALP SERPL-CCNC: 84 U/L — SIGNIFICANT CHANGE UP (ref 40–120)
ALT FLD-CCNC: 13 U/L — SIGNIFICANT CHANGE UP (ref 10–45)
ANION GAP SERPL CALC-SCNC: 15 MMOL/L — SIGNIFICANT CHANGE UP (ref 5–17)
AST SERPL-CCNC: 28 U/L — SIGNIFICANT CHANGE UP (ref 10–40)
BASE EXCESS BLDV CALC-SCNC: 1.5 MMOL/L — SIGNIFICANT CHANGE UP (ref -2–3)
BASE EXCESS BLDV CALC-SCNC: 1.6 MMOL/L — SIGNIFICANT CHANGE UP (ref -2–3)
BASOPHILS # BLD AUTO: 0.06 K/UL — SIGNIFICANT CHANGE UP (ref 0–0.2)
BASOPHILS NFR BLD AUTO: 0.7 % — SIGNIFICANT CHANGE UP (ref 0–2)
BILIRUB SERPL-MCNC: 0.5 MG/DL — SIGNIFICANT CHANGE UP (ref 0.2–1.2)
BUN SERPL-MCNC: 22 MG/DL — SIGNIFICANT CHANGE UP (ref 7–23)
CA-I SERPL-SCNC: 1.24 MMOL/L — SIGNIFICANT CHANGE UP (ref 1.15–1.33)
CA-I SERPL-SCNC: 1.31 MMOL/L — SIGNIFICANT CHANGE UP (ref 1.15–1.33)
CALCIUM SERPL-MCNC: 9.9 MG/DL — SIGNIFICANT CHANGE UP (ref 8.4–10.5)
CHLORIDE BLDV-SCNC: 100 MMOL/L — SIGNIFICANT CHANGE UP (ref 96–108)
CHLORIDE BLDV-SCNC: 103 MMOL/L — SIGNIFICANT CHANGE UP (ref 96–108)
CHLORIDE SERPL-SCNC: 101 MMOL/L — SIGNIFICANT CHANGE UP (ref 96–108)
CO2 BLDV-SCNC: 29 MMOL/L — HIGH (ref 22–26)
CO2 BLDV-SCNC: 32 MMOL/L — HIGH (ref 22–26)
CO2 SERPL-SCNC: 24 MMOL/L — SIGNIFICANT CHANGE UP (ref 22–31)
CREAT SERPL-MCNC: 1.09 MG/DL — SIGNIFICANT CHANGE UP (ref 0.5–1.3)
CRP SERPL-MCNC: 105 MG/L — HIGH (ref 0–4)
EGFR: 70 ML/MIN/1.73M2 — SIGNIFICANT CHANGE UP
EOSINOPHIL # BLD AUTO: 0.11 K/UL — SIGNIFICANT CHANGE UP (ref 0–0.5)
EOSINOPHIL NFR BLD AUTO: 1.3 % — SIGNIFICANT CHANGE UP (ref 0–6)
ERYTHROCYTE [SEDIMENTATION RATE] IN BLOOD: 120 MM/HR — HIGH (ref 0–20)
GAS PNL BLDV: 135 MMOL/L — LOW (ref 136–145)
GAS PNL BLDV: 137 MMOL/L — SIGNIFICANT CHANGE UP (ref 136–145)
GAS PNL BLDV: SIGNIFICANT CHANGE UP
GLUCOSE BLDV-MCNC: 102 MG/DL — HIGH (ref 70–99)
GLUCOSE BLDV-MCNC: 92 MG/DL — SIGNIFICANT CHANGE UP (ref 70–99)
GLUCOSE SERPL-MCNC: 96 MG/DL — SIGNIFICANT CHANGE UP (ref 70–99)
HCO3 BLDV-SCNC: 27 MMOL/L — SIGNIFICANT CHANGE UP (ref 22–29)
HCO3 BLDV-SCNC: 30 MMOL/L — HIGH (ref 22–29)
HCT VFR BLD CALC: 40.5 % — SIGNIFICANT CHANGE UP (ref 39–50)
HCT VFR BLDA CALC: 38 % — LOW (ref 39–51)
HCT VFR BLDA CALC: 42 % — SIGNIFICANT CHANGE UP (ref 39–51)
HGB BLD CALC-MCNC: 12.6 G/DL — SIGNIFICANT CHANGE UP (ref 12.6–17.4)
HGB BLD CALC-MCNC: 14.1 G/DL — SIGNIFICANT CHANGE UP (ref 12.6–17.4)
HGB BLD-MCNC: 13.6 G/DL — SIGNIFICANT CHANGE UP (ref 13–17)
IMM GRANULOCYTES NFR BLD AUTO: 0.6 % — SIGNIFICANT CHANGE UP (ref 0–0.9)
LACTATE BLDV-MCNC: 1.5 MMOL/L — SIGNIFICANT CHANGE UP (ref 0.5–2)
LACTATE BLDV-MCNC: 2.8 MMOL/L — HIGH (ref 0.5–2)
LYMPHOCYTES # BLD AUTO: 1.48 K/UL — SIGNIFICANT CHANGE UP (ref 1–3.3)
LYMPHOCYTES # BLD AUTO: 17.3 % — SIGNIFICANT CHANGE UP (ref 13–44)
MCHC RBC-ENTMCNC: 31.8 PG — SIGNIFICANT CHANGE UP (ref 27–34)
MCHC RBC-ENTMCNC: 33.6 GM/DL — SIGNIFICANT CHANGE UP (ref 32–36)
MCV RBC AUTO: 94.6 FL — SIGNIFICANT CHANGE UP (ref 80–100)
MONOCYTES # BLD AUTO: 0.78 K/UL — SIGNIFICANT CHANGE UP (ref 0–0.9)
MONOCYTES NFR BLD AUTO: 9.1 % — SIGNIFICANT CHANGE UP (ref 2–14)
NEUTROPHILS # BLD AUTO: 6.08 K/UL — SIGNIFICANT CHANGE UP (ref 1.8–7.4)
NEUTROPHILS NFR BLD AUTO: 71 % — SIGNIFICANT CHANGE UP (ref 43–77)
NRBC # BLD: 0 /100 WBCS — SIGNIFICANT CHANGE UP (ref 0–0)
PCO2 BLDV: 46 MMHG — SIGNIFICANT CHANGE UP (ref 42–55)
PCO2 BLDV: 66 MMHG — HIGH (ref 42–55)
PH BLDV: 7.27 — LOW (ref 7.32–7.43)
PH BLDV: 7.38 — SIGNIFICANT CHANGE UP (ref 7.32–7.43)
PLATELET # BLD AUTO: 284 K/UL — SIGNIFICANT CHANGE UP (ref 150–400)
PO2 BLDV: 23 MMHG — LOW (ref 25–45)
PO2 BLDV: 40 MMHG — SIGNIFICANT CHANGE UP (ref 25–45)
POTASSIUM BLDV-SCNC: 4 MMOL/L — SIGNIFICANT CHANGE UP (ref 3.5–5.1)
POTASSIUM BLDV-SCNC: 4.1 MMOL/L — SIGNIFICANT CHANGE UP (ref 3.5–5.1)
POTASSIUM SERPL-MCNC: 4.8 MMOL/L — SIGNIFICANT CHANGE UP (ref 3.5–5.3)
POTASSIUM SERPL-SCNC: 4.8 MMOL/L — SIGNIFICANT CHANGE UP (ref 3.5–5.3)
PROT SERPL-MCNC: 8.8 G/DL — HIGH (ref 6–8.3)
RBC # BLD: 4.28 M/UL — SIGNIFICANT CHANGE UP (ref 4.2–5.8)
RBC # FLD: 11.5 % — SIGNIFICANT CHANGE UP (ref 10.3–14.5)
SAO2 % BLDV: 24.2 % — LOW (ref 67–88)
SAO2 % BLDV: 69.7 % — SIGNIFICANT CHANGE UP (ref 67–88)
SODIUM SERPL-SCNC: 140 MMOL/L — SIGNIFICANT CHANGE UP (ref 135–145)
URATE SERPL-MCNC: 5.1 MG/DL — SIGNIFICANT CHANGE UP (ref 3.4–8.8)
WBC # BLD: 8.56 K/UL — SIGNIFICANT CHANGE UP (ref 3.8–10.5)
WBC # FLD AUTO: 8.56 K/UL — SIGNIFICANT CHANGE UP (ref 3.8–10.5)

## 2024-05-04 PROCEDURE — 73630 X-RAY EXAM OF FOOT: CPT | Mod: 26,LT

## 2024-05-04 PROCEDURE — 99223 1ST HOSP IP/OBS HIGH 75: CPT

## 2024-05-04 RX ORDER — ATENOLOL 25 MG/1
25 TABLET ORAL DAILY
Refills: 0 | Status: DISCONTINUED | OUTPATIENT
Start: 2024-05-04 | End: 2024-05-08

## 2024-05-04 RX ORDER — ATORVASTATIN CALCIUM 80 MG/1
40 TABLET, FILM COATED ORAL AT BEDTIME
Refills: 0 | Status: DISCONTINUED | OUTPATIENT
Start: 2024-05-04 | End: 2024-05-08

## 2024-05-04 RX ORDER — CEFAZOLIN SODIUM 1 G
1000 VIAL (EA) INJECTION EVERY 8 HOURS
Refills: 0 | Status: DISCONTINUED | OUTPATIENT
Start: 2024-05-04 | End: 2024-05-08

## 2024-05-04 RX ORDER — ASPIRIN/CALCIUM CARB/MAGNESIUM 324 MG
81 TABLET ORAL DAILY
Refills: 0 | Status: DISCONTINUED | OUTPATIENT
Start: 2024-05-04 | End: 2024-05-08

## 2024-05-04 RX ORDER — CEFAZOLIN SODIUM 1 G
1000 VIAL (EA) INJECTION ONCE
Refills: 0 | Status: COMPLETED | OUTPATIENT
Start: 2024-05-04 | End: 2024-05-04

## 2024-05-04 RX ORDER — SODIUM CHLORIDE 9 MG/ML
1000 INJECTION INTRAMUSCULAR; INTRAVENOUS; SUBCUTANEOUS ONCE
Refills: 0 | Status: COMPLETED | OUTPATIENT
Start: 2024-05-04 | End: 2024-05-04

## 2024-05-04 RX ORDER — KETOROLAC TROMETHAMINE 30 MG/ML
15 SYRINGE (ML) INJECTION ONCE
Refills: 0 | Status: DISCONTINUED | OUTPATIENT
Start: 2024-05-04 | End: 2024-05-04

## 2024-05-04 RX ADMIN — Medication 15 MILLIGRAM(S): at 18:08

## 2024-05-04 RX ADMIN — Medication 100 MILLIGRAM(S): at 18:08

## 2024-05-04 RX ADMIN — Medication 81 MILLIGRAM(S): at 22:25

## 2024-05-04 RX ADMIN — ATORVASTATIN CALCIUM 40 MILLIGRAM(S): 80 TABLET, FILM COATED ORAL at 22:24

## 2024-05-04 RX ADMIN — SODIUM CHLORIDE 1000 MILLILITER(S): 9 INJECTION INTRAMUSCULAR; INTRAVENOUS; SUBCUTANEOUS at 16:50

## 2024-05-04 NOTE — ED CDU PROVIDER INITIAL DAY NOTE - OBJECTIVE STATEMENT
78-year-old male with a history of CAD status post CABG 2009 on daily aspirin, hyperlipidemia, hypertension, gout, states that he twisted his left ankle yesterday and now complaining of swelling pain and difficulty walking, he feels the same way when he had gout attack, no fever no chills chest pain or shortness of breath. While in ED patient had labs which revealed no leukocytosis, lactate initially 2.8 but downtrended to 1.5, uric acid WNL, xray negative for acute fx. Given dose of ancef for cellulitis. Will place in CDU for IV ABX, pain control and reassessment.

## 2024-05-04 NOTE — ED ADULT TRIAGE NOTE - CHIEF COMPLAINT QUOTE
left foot toe swelling x 4 days, history of gout, worsening pain s/p going for a walk yesterday  took advil last night with partial relief of pain

## 2024-05-04 NOTE — ED PROVIDER NOTE - PROGRESS NOTE DETAILS
Elevated CRP, no white count. Concern for cellulitis of foot. No open wounds. Will give ancef, monitor in CDU overnight. Patient agreeable with plan. CDU to evaluate.  -Neal Schaeffer PGY-2

## 2024-05-04 NOTE — ED CDU PROVIDER DISPOSITION NOTE - CLINICAL COURSE
78-year-old male with a history of CAD status post CABG 2009 on daily aspirin, hyperlipidemia, hypertension, gout, states that he twisted his left ankle yesterday and now complaining of swelling pain and difficulty walking, he feels the same way when he had gout attack, no fever no chills chest pain or shortness of breath. While in ED patient had labs which revealed no leukocytosis, lactate initially 2.8 but downtrended to 1.5, uric acid WNL, xray negative for acute fx. Given dose of ancef for cellulitis. Will place in CDU for IV ABX, pain control and reassessment.     While in CDU____ 78-year-old male with a history of CAD status post CABG 2009 on daily aspirin, hyperlipidemia, hypertension, gout, states that he twisted his left ankle yesterday and now complaining of swelling pain and difficulty walking, he feels the same way when he had gout attack, no fever no chills chest pain or shortness of breath. While in ED patient had labs which revealed no leukocytosis, lactate initially 2.8 but downtrended to 1.5, uric acid WNL, xray negative for acute fx. Given dose of ancef for cellulitis. Will place in CDU for IV ABX, pain control and reassessment.     While in CDU, pt with mild improvement of LLE pain/swelling, able to ambulate. No fevers. Stable for d/c on PO abx.

## 2024-05-04 NOTE — ED CDU PROVIDER DISPOSITION NOTE - NSFOLLOWUPINSTRUCTIONS_ED_ALL_ED_FT
1. Follow up with your PCP within 2-3 days.   2. Rest. Hydrate.   3. Take Ibuprofen (i.e. Motrin, Advil) 600mg every 8 hrs for pain as needed. Take with food.   May alternate with Acetaminophen (Tylenol) 650mg every 6 hours for pain as needed.  4. Take Keflex as directed.   5. Keep foot elevated.   6. Return to the emergency department if you have worsening pain, swelling, fevers, redness or all other concerns. Rest. Keep affected area elevated to improve swelling and healing.     Stay well hydrated.     Take Ibuprofen (i.e. Motrin, Advil) 600mg every 8 hrs for pain as needed. Take with food.  May alternate with Acetaminophen (Tylenol) 650mg every 6 hours for pain as needed.     Take antibiotics as prescribed.     Follow up with your primary care provider in 2-3 days.   Bring copy of results with you.   *** Please follow up this week for repeat blood work: ESR and CRP levels*** as discussed.     Return to ER for any worsening redness, swelling, streaking (red lines), fever, chills, worsening pain, nausea/vomiting, or any other concerning symptoms.

## 2024-05-04 NOTE — ED CDU PROVIDER DISPOSITION NOTE - PATIENT PORTAL LINK FT
You can access the FollowMyHealth Patient Portal offered by [facility] by registering at the following website: [email or web address]. By joining [facility]’s FollowMyHealth portal, you will also be able to view your health information using other applications (apps) compatible with our system.

## 2024-05-04 NOTE — ED CDU PROVIDER DISPOSITION NOTE - ATTENDING APP SHARED VISIT CONTRIBUTION OF CARE
I performed a history and physical exam of the patient and discussed their management with the Advanced Care Practitioner. I reviewed the ACP's note and agree with the documented findings and plan of care.     78 year old male with cad s/p cabg 2009 on daily ASA, hld, htn, gout, states that he twisted his left ankle yesterday and now complaining of swelling pain and difficulty walking. improvement of lle pain and swelling after iv abx and pain control. feels better. will dc home f/u outptatient.  recommend f/u for esr/crp

## 2024-05-04 NOTE — ED PROVIDER NOTE - OBJECTIVE STATEMENT
78-year-old male with a history of CAD status post CABG 2009 on daily aspirin, hyperlipidemia, hypertension, gout, states that he twisted his left ankle yesterday and now complaining of swelling pain and difficulty walking, he feels the same way when he had gout attack, no fever no chills chest pain or shortness of breath

## 2024-05-04 NOTE — ED ADULT NURSE NOTE - OBJECTIVE STATEMENT
78 yr old male with h/o CAD and CABBG 2009 . Pt has h/o HTN, HLD , gout . Pt twisted l ankle and now lt foot swollen and painful. Pt has h/o Gout and foot feels like when he had gout. Has had neg Uric acid in blood Denies chest pain or sob Denies fever or chills. Son present Made comfortable.

## 2024-05-04 NOTE — ED PROVIDER NOTE - CLINICAL SUMMARY MEDICAL DECISION MAKING FREE TEXT BOX
78-year-old male with multiple medical problem , twisted his left ankle yesterday  complaining of pain and swelling and increased warmth ,Has difficulty to ambulate, no fever no chills  , will obtain blood work, sed rate and CRP, x-ray of the ankle, uric acid, and reassess ZR

## 2024-05-04 NOTE — ED CDU PROVIDER INITIAL DAY NOTE - CLINICAL SUMMARY MEDICAL DECISION MAKING FREE TEXT BOX
78-year-old male with multiple medical problem , twisted his left ankle yesterday  complaining of pain and swelling and increased warmth ,Has difficulty to ambulate, no fever no chill. Has a  history of gout and feels that symptoms are similar to his previous attack. In the ER patient had quite elevated CRP of 105. Also elevated lactate. XRay of ankle is negative. Concern for cellulitis vs. gout. Patient has full ROM of joint. Very low suspicion for septic arthritis. Will admit to CDU for IV ABX, pain control and reassessment. ZR

## 2024-05-05 VITALS
OXYGEN SATURATION: 96 % | RESPIRATION RATE: 16 BRPM | DIASTOLIC BLOOD PRESSURE: 68 MMHG | HEART RATE: 64 BPM | SYSTOLIC BLOOD PRESSURE: 103 MMHG | TEMPERATURE: 98 F

## 2024-05-05 PROCEDURE — 99285 EMERGENCY DEPT VISIT HI MDM: CPT | Mod: 25

## 2024-05-05 PROCEDURE — 85018 HEMOGLOBIN: CPT

## 2024-05-05 PROCEDURE — 99239 HOSP IP/OBS DSCHRG MGMT >30: CPT

## 2024-05-05 PROCEDURE — 85025 COMPLETE CBC W/AUTO DIFF WBC: CPT

## 2024-05-05 PROCEDURE — 84132 ASSAY OF SERUM POTASSIUM: CPT

## 2024-05-05 PROCEDURE — 82330 ASSAY OF CALCIUM: CPT

## 2024-05-05 PROCEDURE — 82435 ASSAY OF BLOOD CHLORIDE: CPT

## 2024-05-05 PROCEDURE — 84550 ASSAY OF BLOOD/URIC ACID: CPT

## 2024-05-05 PROCEDURE — 96376 TX/PRO/DX INJ SAME DRUG ADON: CPT

## 2024-05-05 PROCEDURE — 96375 TX/PRO/DX INJ NEW DRUG ADDON: CPT

## 2024-05-05 PROCEDURE — 85652 RBC SED RATE AUTOMATED: CPT

## 2024-05-05 PROCEDURE — 82947 ASSAY GLUCOSE BLOOD QUANT: CPT

## 2024-05-05 PROCEDURE — 85014 HEMATOCRIT: CPT

## 2024-05-05 PROCEDURE — 84295 ASSAY OF SERUM SODIUM: CPT

## 2024-05-05 PROCEDURE — 83605 ASSAY OF LACTIC ACID: CPT

## 2024-05-05 PROCEDURE — 96374 THER/PROPH/DIAG INJ IV PUSH: CPT

## 2024-05-05 PROCEDURE — 86140 C-REACTIVE PROTEIN: CPT

## 2024-05-05 PROCEDURE — G0378: CPT

## 2024-05-05 PROCEDURE — 73630 X-RAY EXAM OF FOOT: CPT

## 2024-05-05 PROCEDURE — 93005 ELECTROCARDIOGRAM TRACING: CPT

## 2024-05-05 PROCEDURE — 80053 COMPREHEN METABOLIC PANEL: CPT

## 2024-05-05 PROCEDURE — 82803 BLOOD GASES ANY COMBINATION: CPT

## 2024-05-05 RX ORDER — CEPHALEXIN 500 MG
1 CAPSULE ORAL
Qty: 28 | Refills: 0
Start: 2024-05-05 | End: 2024-05-11

## 2024-05-05 RX ADMIN — Medication 100 MILLIGRAM(S): at 02:13

## 2024-05-05 RX ADMIN — ATENOLOL 25 MILLIGRAM(S): 25 TABLET ORAL at 11:59

## 2024-05-05 RX ADMIN — Medication 81 MILLIGRAM(S): at 12:04

## 2024-05-05 RX ADMIN — Medication 100 MILLIGRAM(S): at 11:55

## 2024-05-05 NOTE — ED ADULT NURSE REASSESSMENT NOTE - NS ED NURSE REASSESS COMMENT FT1
Pt received from MARCELO Cárdenas. Pt A&O X4, oriented to CDU & plan of care was discussed. Pt is observed in CDU for Lt foot cellulitis. Lt foot swelling noted. Pt denies any chest pain, SOB, dizziness or palpitations. V/S stable, IV 20G, Rt AC, patent and free of signs of infiltration. Pt ambulated to BR with walker. Fall and safety precautions initiated and reviewed with patient. Patient verbalized understanding of the same. Call bell in reach. Will continue to monitor.
Received report from Sarai Rutherford.  pt is  A&Ox4 able to follow all commands. Pulse, motor, sensation present and equal in all 4 extremities. pt Breathing spontaneous and unlabored on room air, Skin warm and dry and of color appropriate for ethnicity , moves all extremities, speech clear. pending dispo. no further nurse intervention needed at this time.

## 2024-05-05 NOTE — ED CDU PROVIDER SUBSEQUENT DAY NOTE - CLINICAL SUMMARY MEDICAL DECISION MAKING FREE TEXT BOX
Jayme: Patient with left foot pain has improved, still has some eft foot pain. pain with weight bearing and mild swelling, + left foot with mild warmth, ambulating in cdu.  improving symptoms. will dc home to f/u outaptient.  patient to repeat esr/crp outpatient.

## 2024-05-05 NOTE — ED ADULT NURSE REASSESSMENT NOTE - NSFALLRISKINTERV_ED_ALL_ED

## 2024-05-05 NOTE — ED CDU PROVIDER SUBSEQUENT DAY NOTE - PROGRESS NOTE DETAILS
Patient seen and evaluated at bedside, resting comfortably, NAD. Reports left foot pain has improved, still has some pain with weight bearing and mild swelling. No fever/chills, no numbness/tingling. VSS. L foot with mild warmth, diffuse swelling to dorsum of foot and toes, no obvious erythema or streaking, able to range ankle/toes, neurovasc intact. Patient seen and evaluated at bedside, resting comfortably, NAD. Reports left foot pain has improved, still has some pain with weight bearing and mild swelling. No fever/chills, no numbness/tingling. VSS. L foot with mild warmth, + ttp to lateral foot, diffuse swelling to dorsum of foot and toes, no obvious erythema or streaking, able to range ankle/toes, neurovasc intact. Patient seen and evaluated at bedside, resting comfortably, NAD. Reports left foot pain has improved, still has some pain with weight bearing and mild swelling. No fever/chills, no numbness/tingling. VSS. L foot with mild warmth, + mild ttp to lateral foot, diffuse swelling to dorsum of foot and toes, no obvious erythema or streaking, able to range ankle/toes, neurovasc intact. Pt ambulatory in CDU. Pt remains feeling well, no new/worsening symptoms, stable for discharge, advised close PCP f/u for repeat blood work (ESR, CRP). D/w Dr. Delacruz.

## 2024-05-05 NOTE — ED CDU PROVIDER SUBSEQUENT DAY NOTE - HISTORY
Patient seen at bedside in NAD.  VSS.  Patient resting comfortably without complaints. Will continue IV ABX and reassessment. Yudelka Tim PA-C

## 2024-05-05 NOTE — ED POST DISCHARGE NOTE - RESULT SUMMARY
[facility] closed, requested change to [facility]. Re-sent rx for Keflex as initially prescribed to pt's preferred [facility]. - GILBERT GuadalupeC

## 2024-06-08 NOTE — PROVIDER CONTACT NOTE (OTHER) - SITUATION
Pt Rectal temp 100.5 BP 94/54
Oral temp: 103.1
Pt rectal temp 104.0 and SpO2 86% RA
pt hypotensive 90/49
Pt rectal Temp 103.7 s/p IV tylenol.
pt has fever of 103.1
Pt temp 101.6, BP 98/58
No difficulties

## 2024-06-16 PROBLEM — I10 BENIGN ESSENTIAL HYPERTENSION: Status: ACTIVE | Noted: 2019-06-06

## 2024-06-16 PROBLEM — E78.5 HYPERLIPEMIA: Status: ACTIVE | Noted: 2019-05-17

## 2024-06-16 PROBLEM — I25.10 CORONARY ARTERY DISEASE: Status: ACTIVE | Noted: 2019-05-17

## 2024-06-16 NOTE — PHYSICAL EXAM
[General Appearance - Well Developed] : well developed [Normal Appearance] : normal appearance [Well Groomed] : well groomed [General Appearance - Well Nourished] : well nourished [No Deformities] : no deformities [General Appearance - In No Acute Distress] : no acute distress [Normal Conjunctiva] : the conjunctiva exhibited no abnormalities [Eyelids - No Xanthelasma] : the eyelids demonstrated no xanthelasmas [Normal Oral Mucosa] : normal oral mucosa [No Oral Pallor] : no oral pallor [No Oral Cyanosis] : no oral cyanosis [Normal Jugular Venous A Waves Present] : normal jugular venous A waves present [Normal Jugular Venous V Waves Present] : normal jugular venous V waves present [No Jugular Venous Myers A Waves] : no jugular venous myers A waves [Respiration, Rhythm And Depth] : normal respiratory rhythm and effort [Exaggerated Use Of Accessory Muscles For Inspiration] : no accessory muscle use [Auscultation Breath Sounds / Voice Sounds] : lungs were clear to auscultation bilaterally [Heart Rate And Rhythm] : heart rate and rhythm were normal [Heart Sounds] : normal S1 and S2 [Murmurs] : no murmurs present [Abdomen Soft] : soft [Abdomen Tenderness] : non-tender [Abdomen Mass (___ Cm)] : no abdominal mass palpated [Abnormal Walk] : normal gait [Gait - Sufficient For Exercise Testing] : the gait was sufficient for exercise testing [Nail Clubbing] : no clubbing of the fingernails [Cyanosis, Localized] : no localized cyanosis [Petechial Hemorrhages (___cm)] : no petechial hemorrhages [] : no rash [Skin Color & Pigmentation] : normal skin color and pigmentation [No Venous Stasis] : no venous stasis [Skin Lesions] : no skin lesions [No Skin Ulcers] : no skin ulcer [No Xanthoma] : no  xanthoma was observed [Oriented To Time, Place, And Person] : oriented to person, place, and time [Affect] : the affect was normal [Mood] : the mood was normal [No Anxiety] : not feeling anxious

## 2024-06-16 NOTE — HISTORY OF PRESENT ILLNESS
[FreeTextEntry1] : 78 year old male with h/o CAD s/p CABG, HTN, HLD who feels well without any c/o CP, SOB or H/A

## 2024-10-16 ENCOUNTER — NON-APPOINTMENT (OUTPATIENT)
Age: 79
End: 2024-10-16

## 2024-10-16 ENCOUNTER — APPOINTMENT (OUTPATIENT)
Dept: CARDIOLOGY | Facility: CLINIC | Age: 79
End: 2024-10-16

## 2024-10-16 VITALS
OXYGEN SATURATION: 99 % | BODY MASS INDEX: 22.14 KG/M2 | HEART RATE: 51 BPM | DIASTOLIC BLOOD PRESSURE: 72 MMHG | WEIGHT: 125 LBS | SYSTOLIC BLOOD PRESSURE: 158 MMHG

## 2024-10-16 PROCEDURE — 93000 ELECTROCARDIOGRAM COMPLETE: CPT

## 2024-10-16 PROCEDURE — G2211 COMPLEX E/M VISIT ADD ON: CPT

## 2024-10-16 PROCEDURE — 99214 OFFICE O/P EST MOD 30 MIN: CPT

## 2025-04-11 ENCOUNTER — NON-APPOINTMENT (OUTPATIENT)
Age: 80
End: 2025-04-11

## 2025-04-14 ENCOUNTER — APPOINTMENT (OUTPATIENT)
Dept: CARDIOLOGY | Facility: CLINIC | Age: 80
End: 2025-04-14

## 2025-04-14 ENCOUNTER — NON-APPOINTMENT (OUTPATIENT)
Age: 80
End: 2025-04-14

## 2025-04-14 VITALS
HEART RATE: 58 BPM | SYSTOLIC BLOOD PRESSURE: 148 MMHG | BODY MASS INDEX: 22.15 KG/M2 | HEIGHT: 63 IN | OXYGEN SATURATION: 98 % | WEIGHT: 125 LBS | DIASTOLIC BLOOD PRESSURE: 77 MMHG

## 2025-04-14 PROCEDURE — G2211 COMPLEX E/M VISIT ADD ON: CPT

## 2025-04-14 PROCEDURE — 99214 OFFICE O/P EST MOD 30 MIN: CPT

## 2025-04-14 PROCEDURE — 93000 ELECTROCARDIOGRAM COMPLETE: CPT

## 2025-09-15 ENCOUNTER — NON-APPOINTMENT (OUTPATIENT)
Age: 80
End: 2025-09-15

## 2025-09-17 ENCOUNTER — APPOINTMENT (OUTPATIENT)
Dept: CARDIOLOGY | Facility: CLINIC | Age: 80
End: 2025-09-17

## 2025-09-17 VITALS — OXYGEN SATURATION: 100 % | HEART RATE: 51 BPM | SYSTOLIC BLOOD PRESSURE: 175 MMHG | DIASTOLIC BLOOD PRESSURE: 66 MMHG

## 2025-09-17 VITALS — SYSTOLIC BLOOD PRESSURE: 159 MMHG | DIASTOLIC BLOOD PRESSURE: 67 MMHG

## 2025-09-20 RX ORDER — LOSARTAN POTASSIUM 50 MG/1
50 TABLET, FILM COATED ORAL DAILY
Qty: 90 | Refills: 3 | Status: ACTIVE | COMMUNITY
Start: 2025-09-17 | End: 1900-01-01